# Patient Record
Sex: FEMALE | Race: WHITE | NOT HISPANIC OR LATINO | Employment: UNEMPLOYED | URBAN - METROPOLITAN AREA
[De-identification: names, ages, dates, MRNs, and addresses within clinical notes are randomized per-mention and may not be internally consistent; named-entity substitution may affect disease eponyms.]

---

## 2018-04-10 LAB
ABSOL LYMPHOCYTES (HISTORICAL): 2.1 K/UL (ref 0.5–4)
ALBUMIN SERPL BCP-MCNC: 4.5 G/DL (ref 3–5.2)
ALP SERPL-CCNC: 74 U/L (ref 43–122)
ALT SERPL W P-5'-P-CCNC: 60 U/L (ref 9–52)
ANION GAP SERPL CALCULATED.3IONS-SCNC: 12 MMOL/L (ref 5–14)
AST SERPL W P-5'-P-CCNC: 40 U/L (ref 14–36)
BASOPHILS # BLD AUTO: 0 % (ref 0–1)
BASOPHILS # BLD AUTO: 0 K/UL (ref 0–0.1)
BILIRUB SERPL-MCNC: 0.7 MG/DL
BILIRUB UR QL STRIP: NEGATIVE MG/DL
BUN SERPL-MCNC: 12 MG/DL (ref 5–25)
CALCIUM SERPL-MCNC: 9.3 MG/DL (ref 8.4–10.2)
CHLORIDE SERPL-SCNC: 102 MEQ/L (ref 97–108)
CLARITY UR: CLEAR
CO2 SERPL-SCNC: 25 MMOL/L (ref 22–30)
COLOR UR: YELLOW
COMMENT (HISTORICAL): ABNORMAL
CREATINE, SERUM (HISTORICAL): 0.6 MG/DL (ref 0.6–1.2)
DEPRECATED RDW RBC AUTO: 14.6 %
EGFR (HISTORICAL): >60 ML/MIN/1.73 M2
EOSINOPHIL # BLD AUTO: 0.1 K/UL (ref 0–0.4)
EOSINOPHIL NFR BLD AUTO: 1 % (ref 0–6)
GLUCOSE SERPL-MCNC: 102 MG/DL (ref 70–99)
GLUCOSE SERPL-MCNC: 105 MG/DL (ref 70–99)
GLUCOSE UR STRIP-MCNC: NEGATIVE MG/DL
HCT VFR BLD AUTO: 41 % (ref 36–46)
HGB BLD-MCNC: 14.1 G/DL (ref 12–16)
HGB UR QL STRIP.AUTO: 50
KETONES UR STRIP-MCNC: NEGATIVE MG/DL
LEUKOCYTE ESTERASE UR QL STRIP: 500
LYMPHOCYTES NFR BLD AUTO: 26 % (ref 25–45)
MCH RBC QN AUTO: 29.7 PG (ref 26–34)
MCHC RBC AUTO-ENTMCNC: 34.3 % (ref 31–36)
MCV RBC AUTO: 87 FL (ref 80–100)
MONOCYTES # BLD AUTO: 0.5 K/UL (ref 0.2–0.9)
MONOCYTES NFR BLD AUTO: 7 % (ref 1–10)
NEUTROPHILS ABS COUNT (HISTORICAL): 5.4 K/UL (ref 1.8–7.8)
NEUTS SEG NFR BLD AUTO: 66 % (ref 45–65)
NITRITE UR QL STRIP: NEGATIVE
PH UR STRIP.AUTO: 5 [PH] (ref 4.5–8)
PLATELET # BLD AUTO: 157 K/MCL (ref 150–450)
POTASSIUM SERPL-SCNC: 4.2 MEQ/L (ref 3.6–5)
PROT UR STRIP-MCNC: 15 MG/DL
RBC # BLD AUTO: 4.74 M/MCL (ref 4–5.2)
SODIUM SERPL-SCNC: 139 MEQ/L (ref 137–147)
SP GR UR STRIP.AUTO: 1.02 (ref 1–1.04)
TOTAL PROTEIN (HISTORICAL): 7.6 G/DL (ref 5.9–8.4)
UROBILINOGEN UR QL STRIP.AUTO: NEGATIVE MG/DL (ref 0–1)
WBC # BLD AUTO: 8.1 K/MCL (ref 4.5–11)

## 2018-05-11 ENCOUNTER — HOSPITAL ENCOUNTER (EMERGENCY)
Facility: HOSPITAL | Age: 23
Discharge: HOME | End: 2018-05-11
Attending: EMERGENCY MEDICINE | Admitting: EMERGENCY MEDICINE

## 2018-05-11 VITALS
SYSTOLIC BLOOD PRESSURE: 119 MMHG | BODY MASS INDEX: 37.5 KG/M2 | WEIGHT: 225.09 LBS | HEART RATE: 104 BPM | TEMPERATURE: 97.8 F | OXYGEN SATURATION: 98 % | DIASTOLIC BLOOD PRESSURE: 63 MMHG | HEIGHT: 65 IN | RESPIRATION RATE: 18 BRPM

## 2018-05-11 DIAGNOSIS — R55 SYNCOPE: ICD-10-CM

## 2018-05-11 DIAGNOSIS — G43.909 MIGRAINE HEADACHE: Primary | ICD-10-CM

## 2018-05-11 DIAGNOSIS — N39.0 UTI (URINARY TRACT INFECTION): ICD-10-CM

## 2018-05-11 LAB
ANION GAP SERPL CALCULATED.3IONS-SCNC: 9 MMOL/L (ref 4–13)
ATRIAL RATE: 92 BPM
BACTERIA UR QL AUTO: ABNORMAL /HPF
BASOPHILS # BLD AUTO: 0.02 THOUSANDS/ΜL (ref 0–0.1)
BASOPHILS NFR BLD AUTO: 0 % (ref 0–1)
BILIRUB UR QL STRIP: NEGATIVE
BUN SERPL-MCNC: 11 MG/DL (ref 5–25)
CALCIUM SERPL-MCNC: 9.1 MG/DL (ref 8.3–10.1)
CHLORIDE SERPL-SCNC: 101 MMOL/L (ref 100–108)
CLARITY UR: CLEAR
CO2 SERPL-SCNC: 30 MMOL/L (ref 21–32)
COLOR UR: YELLOW
COLOR, POC: YELLOW
CREAT SERPL-MCNC: 0.83 MG/DL (ref 0.6–1.3)
EOSINOPHIL # BLD AUTO: 0.05 THOUSAND/ΜL (ref 0–0.61)
EOSINOPHIL NFR BLD AUTO: 1 % (ref 0–6)
ERYTHROCYTE [DISTWIDTH] IN BLOOD BY AUTOMATED COUNT: 14.1 % (ref 11.6–15.1)
GFR SERPL CREATININE-BSD FRML MDRD: 99 ML/MIN/1.73SQ M
GLUCOSE SERPL-MCNC: 96 MG/DL (ref 65–140)
GLUCOSE UR STRIP-MCNC: NEGATIVE MG/DL
HCT VFR BLD AUTO: 38.3 % (ref 34.8–46.1)
HGB BLD-MCNC: 13.3 G/DL (ref 11.5–15.4)
HGB UR QL STRIP.AUTO: ABNORMAL
KETONES UR STRIP-MCNC: NEGATIVE MG/DL
LEUKOCYTE ESTERASE UR QL STRIP: ABNORMAL
LYMPHOCYTES # BLD AUTO: 2.91 THOUSANDS/ΜL (ref 0.6–4.47)
LYMPHOCYTES NFR BLD AUTO: 32 % (ref 14–44)
MCH RBC QN AUTO: 30.8 PG (ref 26.8–34.3)
MCHC RBC AUTO-ENTMCNC: 34.7 G/DL (ref 31.4–37.4)
MCV RBC AUTO: 89 FL (ref 82–98)
MONOCYTES # BLD AUTO: 0.71 THOUSAND/ΜL (ref 0.17–1.22)
MONOCYTES NFR BLD AUTO: 8 % (ref 4–12)
NEUTROPHILS # BLD AUTO: 5.46 THOUSANDS/ΜL (ref 1.85–7.62)
NEUTS SEG NFR BLD AUTO: 59 % (ref 43–75)
NITRITE UR QL STRIP: NEGATIVE
NON-SQ EPI CELLS URNS QL MICRO: ABNORMAL /HPF
NRBC BLD AUTO-RTO: 0 /100 WBCS
P AXIS: 63 DEGREES
PH UR STRIP.AUTO: 5 [PH] (ref 4.5–8)
PLATELET # BLD AUTO: 181 THOUSANDS/UL (ref 149–390)
PMV BLD AUTO: 11.6 FL (ref 8.9–12.7)
POTASSIUM SERPL-SCNC: 3.8 MMOL/L (ref 3.5–5.3)
PR INTERVAL: 120 MS
PROT UR STRIP-MCNC: NEGATIVE MG/DL
QRS AXIS: 46 DEGREES
QRSD INTERVAL: 82 MS
QT INTERVAL: 336 MS
QTC INTERVAL: 415 MS
RBC # BLD AUTO: 4.32 MILLION/UL (ref 3.81–5.12)
RBC #/AREA URNS AUTO: ABNORMAL /HPF
SODIUM SERPL-SCNC: 140 MMOL/L (ref 136–145)
SP GR UR STRIP.AUTO: 1.02 (ref 1–1.03)
T WAVE AXIS: 46 DEGREES
UROBILINOGEN UR QL STRIP.AUTO: 1 E.U./DL
VENTRICULAR RATE: 92 BPM
WBC # BLD AUTO: 9.15 THOUSAND/UL (ref 4.31–10.16)
WBC #/AREA URNS AUTO: ABNORMAL /HPF

## 2018-05-11 PROCEDURE — 99284 EMERGENCY DEPT VISIT MOD MDM: CPT

## 2018-05-11 PROCEDURE — 87086 URINE CULTURE/COLONY COUNT: CPT

## 2018-05-11 PROCEDURE — 96361 HYDRATE IV INFUSION ADD-ON: CPT

## 2018-05-11 PROCEDURE — 93005 ELECTROCARDIOGRAM TRACING: CPT

## 2018-05-11 PROCEDURE — 81002 URINALYSIS NONAUTO W/O SCOPE: CPT | Performed by: EMERGENCY MEDICINE

## 2018-05-11 PROCEDURE — 96375 TX/PRO/DX INJ NEW DRUG ADDON: CPT

## 2018-05-11 PROCEDURE — 85025 COMPLETE CBC W/AUTO DIFF WBC: CPT | Performed by: EMERGENCY MEDICINE

## 2018-05-11 PROCEDURE — 96374 THER/PROPH/DIAG INJ IV PUSH: CPT

## 2018-05-11 PROCEDURE — 93010 ELECTROCARDIOGRAM REPORT: CPT | Performed by: INTERNAL MEDICINE

## 2018-05-11 PROCEDURE — 80048 BASIC METABOLIC PNL TOTAL CA: CPT | Performed by: EMERGENCY MEDICINE

## 2018-05-11 PROCEDURE — 81001 URINALYSIS AUTO W/SCOPE: CPT

## 2018-05-11 PROCEDURE — 36415 COLL VENOUS BLD VENIPUNCTURE: CPT | Performed by: EMERGENCY MEDICINE

## 2018-05-11 RX ORDER — BUTALBITAL, ACETAMINOPHEN AND CAFFEINE 50; 325; 40 MG/1; MG/1; MG/1
1 TABLET ORAL EVERY 4 HOURS PRN
Qty: 15 TABLET | Refills: 0 | Status: SHIPPED | OUTPATIENT
Start: 2018-05-11 | End: 2018-11-06 | Stop reason: ALTCHOICE

## 2018-05-11 RX ORDER — METOCLOPRAMIDE HYDROCHLORIDE 5 MG/ML
10 INJECTION INTRAMUSCULAR; INTRAVENOUS ONCE
Status: COMPLETED | OUTPATIENT
Start: 2018-05-11 | End: 2018-05-11

## 2018-05-11 RX ORDER — DIPHENHYDRAMINE HYDROCHLORIDE 50 MG/ML
12.5 INJECTION INTRAMUSCULAR; INTRAVENOUS ONCE
Status: COMPLETED | OUTPATIENT
Start: 2018-05-11 | End: 2018-05-11

## 2018-05-11 RX ORDER — CEPHALEXIN 250 MG/1
500 CAPSULE ORAL EVERY 12 HOURS SCHEDULED
Qty: 28 CAPSULE | Refills: 0 | Status: SHIPPED | OUTPATIENT
Start: 2018-05-11 | End: 2018-05-18

## 2018-05-11 RX ORDER — KETOROLAC TROMETHAMINE 30 MG/ML
30 INJECTION, SOLUTION INTRAMUSCULAR; INTRAVENOUS ONCE
Status: COMPLETED | OUTPATIENT
Start: 2018-05-11 | End: 2018-05-11

## 2018-05-11 RX ADMIN — SODIUM CHLORIDE 1000 ML: 0.9 INJECTION, SOLUTION INTRAVENOUS at 19:21

## 2018-05-11 RX ADMIN — KETOROLAC TROMETHAMINE 30 MG: 30 INJECTION, SOLUTION INTRAMUSCULAR at 19:15

## 2018-05-11 RX ADMIN — DIPHENHYDRAMINE HYDROCHLORIDE 12.5 MG: 50 INJECTION, SOLUTION INTRAMUSCULAR; INTRAVENOUS at 19:18

## 2018-05-11 RX ADMIN — METOCLOPRAMIDE 10 MG: 5 INJECTION, SOLUTION INTRAMUSCULAR; INTRAVENOUS at 19:16

## 2018-05-11 NOTE — ED PROVIDER NOTES
History  Chief Complaint   Patient presents with    Headache - Recurrent or Known Dx Migraines     pt reports "major migraines" for the past 3 days, reports syncope x2 as well since Saturday  25-year-old female with a history of migraine headaches, bipolar depression, Asperger's disorder and arthritis presents to the emergency department with migraine headache for the last 3 days and also episodes of syncope  Patient states that this migraine is located frontal and by temporal which is her usual location of the migraine  She states it has been constant for the last 3 days and she has been taking Excedrin for it without relief  She has had no fevers or chills  No nausea or vomiting  No chest pain or shortness of breath  She states that she was at [email or web address] a good time she was on a ride with her mother when she passed out for a few seconds  She states that this never happened to her before  Yesterday she states she was at work and was found by another worker on the floor twice  She does not remember feeling like she was going to pass out and has no idea how long she was unconscious  She states she could leave work and so decided to come today to be evaluated          History provided by:  Patient   used: No    Headache - Recurrent or Known Dx Migraines   Pain location:  Frontal, L parietal and R parietal  Quality:  Unable to specify  Radiates to:  Does not radiate  Severity currently:  7/10 (Patient states she normally lives with a 7/10 headache)  Severity at highest:  10/10  Onset quality:  Gradual  Duration:  3 days  Timing:  Constant  Progression:  Unchanged  Chronicity:  Recurrent  Similar to prior headaches: yes    Context: not activity, not exposure to bright light, not stress and not straining    Relieved by:  Nothing  Worsened by:  Nothing  Ineffective treatments:  Acetaminophen  Associated symptoms: syncope    Associated symptoms: no abdominal pain, no back pain, no blurred vision, no congestion, no cough, no diarrhea, no dizziness, no drainage, no ear pain, no eye pain, no facial pain, no fatigue, no fever, no focal weakness, no hearing loss, no loss of balance, no myalgias, no nausea, no near-syncope, no neck pain, no neck stiffness, no numbness, no paresthesias, no photophobia, no seizures, no sinus pressure, no sore throat, no swollen glands, no tingling, no URI, no visual change, no vomiting and no weakness    Syncope:     LOC duration: Unknown  Witnessed: yes      Suspicion of head trauma:  No      None       Past Medical History:   Diagnosis Date    ADHD (attention deficit hyperactivity disorder)     Arthritis     Asperger's disorder     Astigmatism     Bipolar 1 disorder (HCC)     Migraine        History reviewed  No pertinent surgical history  History reviewed  No pertinent family history  I have reviewed and agree with the history as documented  Social History   Substance Use Topics    Smoking status: Never Smoker    Smokeless tobacco: Never Used    Alcohol use No        Review of Systems   Constitutional: Negative  Negative for chills, diaphoresis, fatigue and fever  HENT: Negative  Negative for congestion, ear pain, hearing loss, postnasal drip, rhinorrhea, sinus pressure and sore throat  Eyes: Negative  Negative for blurred vision, photophobia, pain, discharge, redness and itching  Respiratory: Negative  Negative for apnea, cough, chest tightness, shortness of breath and wheezing  Cardiovascular: Positive for syncope  Negative for chest pain, palpitations, leg swelling and near-syncope  Gastrointestinal: Negative  Negative for abdominal pain, diarrhea, nausea and vomiting  Endocrine: Negative  Genitourinary: Negative  Negative for flank pain, frequency and urgency  Musculoskeletal: Negative  Negative for back pain, myalgias, neck pain and neck stiffness  Skin: Negative  Allergic/Immunologic: Negative  Neurological: Positive for syncope and headaches  Negative for dizziness, tremors, focal weakness, seizures, facial asymmetry, speech difficulty, weakness, light-headedness, numbness, paresthesias and loss of balance  Hematological: Negative  Psychiatric/Behavioral: Negative  All other systems reviewed and are negative  Physical Exam  ED Triage Vitals [05/11/18 1832]   Temperature Pulse Respirations Blood Pressure SpO2   97 8 °F (36 6 °C) 98 18 165/88 98 %      Temp Source Heart Rate Source Patient Position - Orthostatic VS BP Location FiO2 (%)   Temporal Monitor Sitting Right arm --      Pain Score       6           Orthostatic Vital Signs  Vitals:    05/11/18 1832 05/11/18 1930 05/11/18 2014   BP: 165/88  119/63   Pulse: 98 84 104   Patient Position - Orthostatic VS: Sitting         Physical Exam   Constitutional: She is oriented to person, place, and time  She appears well-developed and well-nourished  Non-toxic appearance  She does not have a sickly appearance  She does not appear ill  No distress  Patient sitting up in bed legs crossed on cell phone in no distress   HENT:   Head: Normocephalic and atraumatic  Right Ear: Tympanic membrane and external ear normal    Left Ear: Tympanic membrane and external ear normal    Nose: Nose normal    Mouth/Throat: Oropharynx is clear and moist  No oropharyngeal exudate  Eyes: Conjunctivae and EOM are normal  Pupils are equal, round, and reactive to light  Right eye exhibits no discharge  Left eye exhibits no discharge  No scleral icterus  Neck: Normal range of motion  Neck supple  No spinous process tenderness and no muscular tenderness present  No neck rigidity  No edema, no erythema and normal range of motion present  Cardiovascular: Normal rate, regular rhythm and normal heart sounds  Exam reveals no gallop and no friction rub  No murmur heard  Pulmonary/Chest: Effort normal and breath sounds normal  No respiratory distress   She has no wheezes  She has no rales  She exhibits no tenderness  Abdominal: Soft  Bowel sounds are normal  She exhibits no distension and no mass  There is no tenderness  No hernia  Musculoskeletal: Normal range of motion  She exhibits no edema, tenderness or deformity  Lymphadenopathy:     She has no cervical adenopathy  Neurological: She is alert and oriented to person, place, and time  She has normal reflexes  She displays normal reflexes  No cranial nerve deficit  She exhibits normal muscle tone  Coordination normal    Skin: Skin is warm and dry  No rash noted  She is not diaphoretic  No erythema  No pallor  Psychiatric: She has a normal mood and affect  Nursing note and vitals reviewed  ED Medications  Medications   sodium chloride 0 9 % bolus 1,000 mL (0 mL Intravenous Stopped 5/11/18 2018)   ketorolac (TORADOL) injection 30 mg (30 mg Intravenous Given 5/11/18 1915)   metoclopramide (REGLAN) injection 10 mg (10 mg Intravenous Given 5/11/18 1916)   diphenhydrAMINE (BENADRYL) injection 12 5 mg (12 5 mg Intravenous Given 5/11/18 1918)       Diagnostic Studies  Results Reviewed     Procedure Component Value Units Date/Time    Basic metabolic panel [[id number]] Collected:  05/11/18 1914    Lab Status:  Final result Specimen:  Blood from Arm, Right Updated:  05/11/18 1948     Sodium 140 mmol/L      Potassium 3 8 mmol/L      Chloride 101 mmol/L      CO2 30 mmol/L      Anion Gap 9 mmol/L      BUN 11 mg/dL      Creatinine 0 83 mg/dL      Glucose 96 mg/dL      Calcium 9 1 mg/dL      eGFR 99 ml/min/1 73sq m     Narrative:         National Kidney Disease Education Program recommendations are as follows:  GFR calculation is accurate only with a steady state creatinine  Chronic Kidney disease less than 60 ml/min/1 73 sq  meters  Kidney failure less than 15 ml/min/1 73 sq  meters      Urine Microscopic [[id number]]  (Abnormal) Collected:  05/11/18 1856    Lab Status:  Final result Specimen:  Urine from Urine, Clean Catch Updated:  05/11/18 1940     RBC, UA 0-1 (A) /hpf      WBC, UA 10-20 (A) /hpf      Epithelial Cells Occasional /hpf      Bacteria, UA Occasional /hpf     Urine culture [[id number]] Collected:  05/11/18 1856    Lab Status: In process Specimen:  Urine from Urine, Clean Catch Updated:  05/11/18 1939    CBC and differential [[id number]] Collected:  05/11/18 1914    Lab Status:  Final result Specimen:  Blood from Arm, Right Updated:  05/11/18 1926     WBC 9 15 Thousand/uL      RBC 4 32 Million/uL      Hemoglobin 13 3 g/dL      Hematocrit 38 3 %      MCV 89 fL      MCH 30 8 pg      MCHC 34 7 g/dL      RDW 14 1 %      MPV 11 6 fL      Platelets 193 Thousands/uL      nRBC 0 /100 WBCs      Neutrophils Relative 59 %      Lymphocytes Relative 32 %      Monocytes Relative 8 %      Eosinophils Relative 1 %      Basophils Relative 0 %      Neutrophils Absolute 5 46 Thousands/µL      Lymphocytes Absolute 2 91 Thousands/µL      Monocytes Absolute 0 71 Thousand/µL      Eosinophils Absolute 0 05 Thousand/µL      Basophils Absolute 0 02 Thousands/µL     POCT urinalysis dipstick [[id number]]  (Normal) Resulted:  05/11/18 1908    Lab Status:  Final result Specimen:  Urine Updated:  05/11/18 1908     Color, UA yellow    ED Urine Macroscopic [[id number]]  (Abnormal) Collected:  05/11/18 1856    Lab Status:  Final result Specimen:  Urine Updated:  05/11/18 1855     Color, UA Yellow     Clarity, UA Clear     pH, UA 5 0     Leukocytes, UA Large (A)     Nitrite, UA Negative     Protein, UA Negative mg/dl      Glucose, UA Negative mg/dl      Ketones, UA Negative mg/dl      Urobilinogen, UA 1 0 E U /dl      Bilirubin, UA Negative     Blood, UA Small (A)     Specific [place], UA 1 020    Narrative:       CLINITEK RESULT                 No orders to display              Procedures  Procedures       Phone Contacts  ED Phone Contact    ED Course  ED Course as of May 12 0133   Fri May 11, 2018   2003 Patient feeling better  Updated about results    Stable for discharge                                MDM  Number of Diagnoses or Management Options  Diagnosis management comments: 77-year-old female presents with migraine headache for 3 days and also multiple syncopal events  She states the migraine is typical of her usual and she has had migraines since childhood  She has never seen a neurologist   What concerned her is that she has had a couple of episodes of passing out over the last couple of days  This is new for her  First 1 [place]  She was on a ride when this happened  She states she remembers her mom shaking her to wake her up  She went on with her day  Yesterday she was at work and was found by a co-worker on the floor twice  She does not remember feeling like she was going to pass out has had no symptoms  She continued her work day but decided to come here today to be evaluated  On exam she appears very well and is sitting up in bed on her phone in no distress  Her neuro exam here is completely normal  I am uncertain that she actually had any syncopal events  She does admit to being tired and may have just simply fallen asleep  The syncopal event on the ride may have been secondary to being on a ride with constant spinning motion  I do not believe in any way that this syncopal event was secondary to her headache  The headache had a gradual onset and has been constant  She seems to be functioning normally despite the headache and the syncopal events  She seems to be in no distress about it  Will check EKG to assess intervals  Will check basic labs to rule out anemia/electrolyte abnormality    Will give migraine cocktail and reassess       Amount and/or Complexity of Data Reviewed  Clinical lab tests: ordered and reviewed  Independent visualization of images, tracings, or specimens: yes      CritCare Time    Disposition  Final diagnoses:   Migraine headache   Syncope   UTI (urinary tract infection)     Time reflects when diagnosis was documented in both MDM as applicable and the Disposition within this note     Time User Action Codes Description Comment    5/11/2018  8:05 PM Farrah Arango Add [G43 909] Migraine headache     5/11/2018  8:06 PM Magali PECK Add [R55] Syncope     5/11/2018  8:06 PM Magali PECK Add [N39 0] UTI (urinary tract infection)       ED Disposition     ED Disposition Condition Comment    Discharge  [facility] discharge to home/self care  Condition at discharge: Good        Follow-up Information     Follow up With Specialties Details Why [address] [facility] Schedule an appointment as soon as possible for a visit in 1 week  Ramin Ren [address]  [phone]        Discharge Medication List as of 5/11/2018  8:10 PM      START taking these medications    Details   butalbital-acetaminophen-caffeine (FIORICET,ESGIC) -40 mg per tablet Take 1 tablet by mouth every 4 (four) hours as needed for headaches, Starting Fri 5/11/2018, Print      cephalexin (KEFLEX) 250 mg capsule Take 2 capsules (500 mg total) by mouth every 12 (twelve) hours for 7 days, Starting Fri 5/11/2018, Until Fri 5/18/2018, Print           No discharge procedures on file      ED Provider  Electronically Signed by           Jerrod Powers DO  05/11/18 2009       Jerrod Powers DO  05/12/18 9354

## 2018-05-11 NOTE — ED PROCEDURE NOTE
PROCEDURE  ECG 12 Lead Documentation  Date/Time: 5/11/2018 7:11 PM  Performed by: Will Reason  Authorized by: Will Ricci     ECG reviewed by me, the ED Provider: yes    Patient location:  ED  Interpretation:     Interpretation: normal    Rate:     ECG rate assessment: normal    Rhythm:     Rhythm: sinus rhythm    Ectopy:     Ectopy: none    QRS:     QRS axis:  Normal  Conduction:     Conduction: normal    ST segments:     ST segments:  Normal  T waves:     T waves: normal           Carol Ann Stanley DO  05/11/18 1912

## 2018-05-11 NOTE — ED NOTES
Patient is resting comfortably with visitor at bedside watching TV       Ana Carrington RN  05/11/18 6113

## 2018-05-12 NOTE — DISCHARGE INSTRUCTIONS
Migraine Headache   WHAT YOU NEED TO KNOW:   A migraine is a severe headache  The pain can be so severe that it interferes with your daily activities  A migraine can last a few hours up to several days  The exact cause of migraines is not known  DISCHARGE INSTRUCTIONS:   Return to the emergency department if:   · You have a headache that seems different or much worse than your usual migraine headache  · You have a severe headache with a fever or a stiff neck  · You have new problems with speech, vision, balance, or movement  · You feel like you are going to faint, you become confused, or you have a seizure  Contact your healthcare provider or neurologist if:   · Your migraines interfere with your daily activities  · Your medicines or treatments stop working  · You have questions or concerns about your condition or care  Medicines: You may need any of the following  Take medicine as soon as you feel a migraine begin  · Prescription pain medicine  may be given  Do not wait until the pain is severe before you take your medicine  · Migraine medicines  are used to help prevent a migraine or stop it once it starts  · Antinausea medicine  may be given to calm your stomach and to help prevent vomiting  This medicine can also help relieve pain  · Take your medicine as directed  Contact your healthcare provider if you think your medicine is not helping or if you have side effects  Tell him or her if you are allergic to any medicine  Keep a list of the medicines, vitamins, and herbs you take  Include the amounts, and when and why you take them  Bring the list or the pill bottles to follow-up visits  Carry your medicine list with you in case of an emergency  Manage your symptoms:   · Rest in a dark, quiet room  This will help decrease your pain  Sleep may also help relieve the pain  · Apply ice to decrease pain  Use an ice pack, or put crushed ice in a plastic bag   Cover the ice pack with a towel and place it on your head  Apply ice for 15 to 20 minutes every hour  · Apply heat to decrease pain and muscle spasms  Use a small towel dampened with warm water or a heating pad, or sit in a warm bath  Apply heat on the area for 20 to 30 minutes every 2 hours  You may alternate heat and ice  · Keep a migraine record  Write down when your migraines start and stop  Include your symptoms and what you were doing when a migraine began  Record what you ate or drank for 24 hours before the migraine started  Keep track of what you did to treat your migraine and if it worked  Bring the migraine record with you to visits with your healthcare provider  Follow up with your healthcare provider or neurologist as directed:  Bring your migraine record with you  Write down your questions so you remember to ask them during your visits  Prevent another migraine:   · Do not smoke  Nicotine and other chemicals in cigarettes and cigars can trigger a migraine or make it worse  Ask your healthcare provider for information if you currently smoke and need help to quit  E-cigarettes or smokeless tobacco still contain nicotine  Talk to your healthcare provider before you use these products  · Do not drink alcohol  Alcohol can trigger a migraine  It can also keep medicines used to treat your migraines from working  · Get regular exercise  Exercise may help prevent migraines  Talk to your healthcare provider about the best exercise plan for you  Try to get at least 30 minutes of exercise on most days  · Manage stress  Stress may trigger a migraine  Learn new ways to relax, such as deep breathing  · Create a sleep schedule  Go to bed and get up at the same times each day  Do not watch television before bed  · Eat regular meals  Include healthy foods such as include fruit, vegetables, whole-grain breads, low-fat dairy products, beans, lean meat, and fish   Do not have food or drinks that trigger your migraines  © 2017 [facility] Information is for End User's use only and may not be sold, redistributed or otherwise used for commercial purposes  All illustrations and images included in CareNotes® are the copyrighted property of A D A M [employer]  or Michael Garcia  The above information is an  only  It is not intended as medical advice for individual conditions or treatments  Talk to your doctor, nurse or pharmacist before following any medical regimen to see if it is safe and effective for you  Syncope   WHAT YOU NEED TO KNOW:   Syncope is also called fainting or passing out  Syncope is a sudden, temporary loss of consciousness, followed by a fall from a standing or sitting position  Syncope ranges from not serious to a sign of a more serious condition that needs to be treated  You can control some health conditions that cause syncope  Your healthcare providers can help you create a plan to manage syncope and prevent episodes  DISCHARGE INSTRUCTIONS:   Seek care immediately if:   · You are bleeding because you hit your head when you fainted  · You suddenly have double vision, difficulty speaking, numbness, and cannot move your arms or legs  · You have chest pain and trouble breathing  · You vomit blood or material that looks like coffee grounds  · You see blood in your bowel movement  Contact your healthcare provider if:   · You have new or worsening symptoms  · You have another syncope episode  · You have a headache, fast heartbeat, or feel too dizzy to stand up  · You have questions or concerns about your condition or care  Follow up with your healthcare provider as directed:  Write down your questions so you remember to ask them during your visits  Manage syncope:   · Keep a record of your syncope episodes  Include your symptoms and your activity before and after the episode   The record can help your healthcare provider find the cause of your syncope and help you manage episodes  · Sit or lie down when needed  This includes when you feel dizzy, your throat is getting tight, and your vision changes  Raise your legs above the level of your heart  · Take slow, deep breaths if you start to breathe faster with anxiety or fear  This can help decrease dizziness and the feeling that you might faint  · Check your blood pressure often  This is important if you take medicine to lower your blood pressure  Check your blood pressure when you are lying down and when you are standing  Ask how often to check during the day  Keep a record of your blood pressure numbers  Your healthcare provider may use the record to help plan your treatment  Prevent a syncope episode:   · Move slowly and let yourself get used to one position before you move to another position  This is very important when you change from a lying or sitting position to a standing position  Take some deep breaths before you stand up from a lying position  Stand up slowly  Sudden movements may cause a fainting spell  Sit on the side of the bed or couch for a few minutes before you stand up  If you are on bedrest, try to be upright for about 2 hours each day, or as directed  Do not lock your legs if you are standing for a long period of time  Move your legs and bend your knees to keep blood flowing  · Follow your healthcare provider's recommendations  Your provider may  recommend that you drink more liquids to prevent dehydration  You may also need to have more salt to keep your blood pressure from dropping too low and causing syncope  Your provider will tell you how much liquid and sodium to have each day  · Watch for signs of low blood sugar  These include hunger, nervousness, sweating, and fast or fluttery heartbeats  Talk with your healthcare provider about ways to keep your blood sugar level steady  · Do not strain if you are constipated    You may faint if you strain to have a bowel movement  Walking is the best way to get your bowels moving  Eat foods high in fiber to make it easier to have a bowel movement  Good examples are high-fiber cereals, beans, vegetables, and whole-grain breads  Prune juice may help make bowel movements softer  · Be careful in hot weather  Heat can cause a syncope episode  Limit activity done outside on hot days  Physical activity in hot weather can lead to dehydration  This can cause an episode  © 2017 [address] [employer]  Information is for End User's use only and may not be sold, redistributed or otherwise used for commercial purposes  All illustrations and images included in CareNotes® are the copyrighted property of A [employer] M [employer]  or Michael Garcia  The above information is an  only  It is not intended as medical advice for individual conditions or treatments  Talk to your doctor, nurse or pharmacist before following any medical regimen to see if it is safe and effective for you  Urinary Tract Infection in Women   WHAT YOU NEED TO KNOW:   A urinary tract infection (UTI) is caused by bacteria that get inside your urinary tract  Most bacteria that enter your urinary tract come out when you urinate  If the bacteria stay in your urinary tract, you may get an infection  Your urinary tract includes your kidneys, ureters, bladder, and urethra  Urine is made in your kidneys, and it flows from the ureters to the bladder  Urine leaves the bladder through the urethra  A UTI is more common in your lower urinary tract, which includes your bladder and urethra  DISCHARGE INSTRUCTIONS:   Return to the emergency department if:   · You are urinating very little or not at all  · You have a high fever with shaking chills  · You have side or back pain that gets worse  Contact your healthcare provider if:   · You have a fever  · You do not feel better after 2 days of taking antibiotics  · You are vomiting  · You have questions or concerns about your condition or care  Medicines:   · Antibiotics  help fight a bacterial infection  · Medicines  may be given to decrease pain and burning when you urinate  They will also help decrease the feeling that you need to urinate often  These medicines will make your urine orange or red  · Take your medicine as directed  Contact your healthcare provider if you think your medicine is not helping or if you have side effects  Tell him or her if you are allergic to any medicine  Keep a list of the medicines, vitamins, and herbs you take  Include the amounts, and when and why you take them  Bring the list or the pill bottles to follow-up visits  Carry your medicine list with you in case of an emergency  Follow up with your healthcare provider as directed:  Write down your questions so you remember to ask them during your visits  Prevent another UTI:   · Empty your bladder often  Urinate and empty your bladder as soon as you feel the need  Do not hold your urine for long periods of time  · Wipe from front to back after you urinate or have a bowel movement  This will help prevent germs from getting into your urinary tract through your urethra  · Drink liquids as directed  Ask how much liquid to drink each day and which liquids are best for you  You may need to drink more liquids than usual to help flush out the bacteria  Do not drink alcohol, caffeine, or citrus juices  These can irritate your bladder and increase your symptoms  Your healthcare provider may recommend cranberry juice to help prevent a UTI  · Urinate after you have sex  This can help flush out bacteria passed during sex  · Do not douche or use feminine deodorants  These can change the chemical balance in your vagina  · Change sanitary pads or tampons often  This will help prevent germs from getting into your urinary tract  · Do pelvic muscle exercises often    Pelvic muscle exercises may help you start and stop urinating  Strong pelvic muscles may help you empty your bladder easier  Squeeze these muscles tightly for 5 seconds like you are trying to hold back urine  Then relax for 5 seconds  Gradually work up to squeezing for 10 seconds  Do 3 sets of 15 repetitions a day, or as directed  © 2017 [address] [employer] Information is for End User's use only and may not be sold, redistributed or otherwise used for commercial purposes  All illustrations and images included in CareNotes® are the copyrighted property of [employer]  or Michael Garcia  The above information is an  only  It is not intended as medical advice for individual conditions or treatments  Talk to your doctor, nurse or pharmacist before following any medical regimen to see if it is safe and effective for you

## 2018-05-16 LAB — BACTERIA UR CULT: NORMAL

## 2018-11-02 ENCOUNTER — TELEPHONE (OUTPATIENT)
Dept: OBGYN CLINIC | Facility: CLINIC | Age: 23
End: 2018-11-02

## 2018-11-06 ENCOUNTER — ANNUAL EXAM (OUTPATIENT)
Dept: OBGYN CLINIC | Facility: CLINIC | Age: 23
End: 2018-11-06
Payer: MEDICARE

## 2018-11-06 VITALS — BODY MASS INDEX: 42.77 KG/M2 | WEIGHT: 257 LBS

## 2018-11-06 DIAGNOSIS — Z12.39 SCREENING FOR BREAST CANCER: ICD-10-CM

## 2018-11-06 DIAGNOSIS — Z12.4 SCREENING FOR CERVICAL CANCER: ICD-10-CM

## 2018-11-06 DIAGNOSIS — Z11.3 SCREEN FOR STD (SEXUALLY TRANSMITTED DISEASE): ICD-10-CM

## 2018-11-06 DIAGNOSIS — Z01.419 ENCOUNTER FOR GYNECOLOGICAL EXAMINATION WITHOUT ABNORMAL FINDING: Primary | ICD-10-CM

## 2018-11-06 PROCEDURE — 88141 CYTOPATH C/V INTERPRET: CPT | Performed by: PATHOLOGY

## 2018-11-06 PROCEDURE — 87491 CHLMYD TRACH DNA AMP PROBE: CPT | Performed by: NURSE PRACTITIONER

## 2018-11-06 PROCEDURE — 99385 PREV VISIT NEW AGE 18-39: CPT | Performed by: NURSE PRACTITIONER

## 2018-11-06 PROCEDURE — 87591 N.GONORRHOEAE DNA AMP PROB: CPT | Performed by: NURSE PRACTITIONER

## 2018-11-06 PROCEDURE — G0145 SCR C/V CYTO,THINLAYER,RESCR: HCPCS | Performed by: PATHOLOGY

## 2018-11-06 NOTE — PROGRESS NOTES
Gio Acosta is a 20 y o  female who presents today for annual GYN exam   Her last pap smear was performed 2017 and result was HSIL  She had colposcopy performed 2017 which noted CIN1 and biopsy was benign  She was supposed to have follow up pap smear 6 months from then, but did not follow up  She reports menses as irregular secondary to PCOS  Patient's last menstrual period was 2018 (exact date)  Her contraceptive method is condoms  Her general medical history has been reviewed and she reports it as follows:    Past Medical History:   Diagnosis Date    ADHD (attention deficit hyperactivity disorder)     Bipolar 1 disorder ([facility] [address] )     PCOS (polycystic ovarian syndrome)      Past Surgical History:   Procedure Laterality Date    NO PAST SURGERIES       OB History      Para Term  AB Living    2 0 0 0 2 0    SAB TAB Ectopic Multiple Live Births    2 0 0 0 0        Social History   Substance Use Topics    Smoking status: Never Smoker    Smokeless tobacco: Never Used    Alcohol use Yes      Comment: 2-3x/month     Cancer-related family history includes Cancer in her mother (cervical)  There is no history of Breast cancer  Current medications: none    Review of Systems:  Review of Systems   Constitutional: Negative  Gastrointestinal: Negative  Genitourinary: Negative for difficulty urinating, pelvic pain and vaginal discharge  Skin: Negative  Physical Exam:  Wt 117 kg (257 lb)   LMP 2018 (Exact Date)   BMI 42 77 kg/m²   Physical Exam   Constitutional: She is oriented to person, place, and time  She appears well-developed and well-nourished  Hirsutism noted   Genitourinary: Vagina normal and uterus normal  There is no lesion on the right labia  There is no lesion on the left labia  Vagina exhibits no lesion and no rugosity  No vaginal discharge found   Right adnexum does not display mass and does not display tenderness  Left adnexum does not display mass and does not display tenderness  Cervix does not exhibit motion tenderness, lesion or pinkness  Uterus is not tender  Neck: Neck supple  No thyromegaly present  Cardiovascular: Normal rate and regular rhythm  Pulmonary/Chest: Effort normal and breath sounds normal  Right breast exhibits no mass, no nipple discharge, no skin change and no tenderness  Left breast exhibits no mass, no nipple discharge, no skin change and no tenderness  Abdominal: Soft  Bowel sounds are normal    Neurological: She is alert and oriented to person, place, and time  Skin: Skin is warm and dry  Assessment:   1  Normal well-woman GYN exam     Plan:   1  Pap smear done with HPV reflex  2  Cultures ordered: GC/CT  3  Return to office 1 year

## 2018-11-06 NOTE — LETTER
2018    To Deep Moody  : 1995      This letter is to advise you that your recent PAP SMEAR results were reviewed by me and are NORMAL    We will see you in 1 year for your annual exam     COY Swanson

## 2018-11-08 ENCOUNTER — OFFICE VISIT (OUTPATIENT)
Dept: FAMILY MEDICINE CLINIC | Facility: CLINIC | Age: 23
End: 2018-11-08
Payer: MEDICARE

## 2018-11-08 VITALS
TEMPERATURE: 96.1 F | WEIGHT: 251 LBS | SYSTOLIC BLOOD PRESSURE: 110 MMHG | HEIGHT: 65 IN | BODY MASS INDEX: 41.82 KG/M2 | DIASTOLIC BLOOD PRESSURE: 80 MMHG | RESPIRATION RATE: 15 BRPM | HEART RATE: 86 BPM | OXYGEN SATURATION: 98 %

## 2018-11-08 DIAGNOSIS — G89.29 CHRONIC PAIN OF BOTH KNEES: ICD-10-CM

## 2018-11-08 DIAGNOSIS — M25.561 CHRONIC PAIN OF BOTH KNEES: ICD-10-CM

## 2018-11-08 DIAGNOSIS — J45.20 MILD INTERMITTENT ASTHMA WITHOUT COMPLICATION: ICD-10-CM

## 2018-11-08 DIAGNOSIS — Z00.00 ENCOUNTER FOR ANNUAL PHYSICAL EXAM: Primary | ICD-10-CM

## 2018-11-08 DIAGNOSIS — M54.50 CHRONIC MIDLINE LOW BACK PAIN WITHOUT SCIATICA: ICD-10-CM

## 2018-11-08 DIAGNOSIS — M25.562 CHRONIC PAIN OF BOTH KNEES: ICD-10-CM

## 2018-11-08 DIAGNOSIS — E28.2 PCOS (POLYCYSTIC OVARIAN SYNDROME): ICD-10-CM

## 2018-11-08 DIAGNOSIS — G89.29 CHRONIC MIDLINE LOW BACK PAIN WITHOUT SCIATICA: ICD-10-CM

## 2018-11-08 PROCEDURE — 99395 PREV VISIT EST AGE 18-39: CPT | Performed by: PHYSICIAN ASSISTANT

## 2018-11-08 PROCEDURE — 3725F SCREEN DEPRESSION PERFORMED: CPT | Performed by: PHYSICIAN ASSISTANT

## 2018-11-08 RX ORDER — ALBUTEROL SULFATE 90 UG/1
2 AEROSOL, METERED RESPIRATORY (INHALATION) EVERY 6 HOURS PRN
Qty: 18 G | Refills: 0 | Status: SHIPPED | OUTPATIENT
Start: 2018-11-08 | End: 2019-01-23

## 2018-11-08 RX ORDER — IBUPROFEN 800 MG/1
800 TABLET ORAL EVERY 8 HOURS PRN
Qty: 60 TABLET | Refills: 0 | Status: SHIPPED | OUTPATIENT
Start: 2018-11-08 | End: 2019-01-23
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
## 2018-11-08 NOTE — ASSESSMENT & PLAN NOTE
- will prescribe inhaler for patient to use when needed for both seasonal asthma/allergies and exercise induced asthma

## 2018-11-08 NOTE — ASSESSMENT & PLAN NOTE
- will prescribe ibuprofen 800 mg TID PRN for pain; advised patient not to take more than 3 times per day due to risk of kidney toxicity   - will refer to physical therapy

## 2018-11-08 NOTE — ASSESSMENT & PLAN NOTE
- patient has strong family history of diabetes, so will order A1c and CMP to check sugars and kidney function   - patient is working on weight loss; recently started medications from Dr Jonas Enciso, and has been working on being more physically active

## 2018-11-09 ENCOUNTER — TELEPHONE (OUTPATIENT)
Dept: OBGYN CLINIC | Facility: CLINIC | Age: 23
End: 2018-11-09

## 2018-11-09 DIAGNOSIS — A64 STD (FEMALE): Primary | ICD-10-CM

## 2018-11-09 LAB
CHLAMYDIA DNA CVX QL NAA+PROBE: NORMAL
N GONORRHOEA DNA GENITAL QL NAA+PROBE: NORMAL

## 2018-11-09 NOTE — TELEPHONE ENCOUNTER
Patient is at the pharmacy but, the medication is not there  I attempted to reach the back line but, there was no answer

## 2018-11-09 NOTE — TELEPHONE ENCOUNTER
I called patient to review + testing for gonorrhea and chlamydia  I advised her that I want a urine specimen for confirmation of these test results  And that she needs treatment with Ceftriaxone and Azithromycin  I called in Rx to [facility] pharmacy now  She will come and get medication from pharmacy first and then come here to for administration of IM Ceftriaxone  She will give us urine specimen then while she is here  She agrees with plan

## 2018-11-12 DIAGNOSIS — A54.9 GONORRHEA: Primary | ICD-10-CM

## 2018-11-13 ENCOUNTER — TELEPHONE (OUTPATIENT)
Dept: OBGYN CLINIC | Facility: CLINIC | Age: 23
End: 2018-11-13

## 2018-11-13 LAB
LAB AP GYN PRIMARY INTERPRETATION: NORMAL
LAB AP LMP: NORMAL
Lab: NORMAL
PATH INTERP SPEC-IMP: NORMAL

## 2018-11-13 NOTE — TELEPHONE ENCOUNTER
I called patient and discussed with her that we are having difficulty obtaining insurance approval for her Ceftriaxone therapy  Advised her to go to [facility] this afternoon and can receive free treatment with Ceftriaxone and Azithromycin  Patient agrees

## 2018-11-14 ENCOUNTER — TELEPHONE (OUTPATIENT)
Dept: OBGYN CLINIC | Facility: CLINIC | Age: 23
End: 2018-11-14

## 2018-11-14 DIAGNOSIS — A59.9 TRICHOMONIASIS: Primary | ICD-10-CM

## 2018-11-14 RX ORDER — METRONIDAZOLE 500 MG/1
500 TABLET ORAL EVERY 12 HOURS SCHEDULED
Qty: 14 TABLET | Refills: 0 | Status: SHIPPED | OUTPATIENT
Start: 2018-11-14 | End: 2018-11-21

## 2018-11-14 NOTE — TELEPHONE ENCOUNTER
Patient called back  I reviewed + trichomonas on her pap  Sent Rx Flagyl to her pharmacy  She agrees

## 2019-01-23 ENCOUNTER — APPOINTMENT (EMERGENCY)
Dept: ULTRASOUND IMAGING | Facility: HOSPITAL | Age: 24
End: 2019-01-23
Payer: COMMERCIAL

## 2019-01-23 ENCOUNTER — HOSPITAL ENCOUNTER (EMERGENCY)
Facility: HOSPITAL | Age: 24
Discharge: HOME | End: 2019-01-23
Attending: EMERGENCY MEDICINE | Admitting: EMERGENCY MEDICINE
Payer: COMMERCIAL

## 2019-01-23 VITALS
DIASTOLIC BLOOD PRESSURE: 76 MMHG | HEART RATE: 83 BPM | TEMPERATURE: 98.9 F | OXYGEN SATURATION: 98 % | BODY MASS INDEX: 42.47 KG/M2 | WEIGHT: 251.32 LBS | SYSTOLIC BLOOD PRESSURE: 141 MMHG | RESPIRATION RATE: 16 BRPM

## 2019-01-23 DIAGNOSIS — R55 VASOVAGAL NEAR SYNCOPE: ICD-10-CM

## 2019-01-23 DIAGNOSIS — N93.9 VAGINAL BLEEDING: Primary | ICD-10-CM

## 2019-01-23 LAB
ALBUMIN SERPL BCP-MCNC: 5 G/DL (ref 3–5.2)
ALP SERPL-CCNC: 81 U/L (ref 43–122)
ALT SERPL W P-5'-P-CCNC: 42 U/L (ref 9–52)
ANION GAP SERPL CALCULATED.3IONS-SCNC: 11 MMOL/L (ref 5–14)
AST SERPL W P-5'-P-CCNC: 44 U/L (ref 14–36)
ATRIAL RATE: 97 BPM
B-HCG SERPL-ACNC: <3 MIU/ML
BACTERIA UR QL AUTO: ABNORMAL /HPF
BASOPHILS # BLD AUTO: 0 THOUSANDS/ΜL (ref 0–0.1)
BASOPHILS NFR BLD AUTO: 1 % (ref 0–1)
BILIRUB SERPL-MCNC: 0.8 MG/DL
BILIRUB UR QL STRIP: NEGATIVE
BUN SERPL-MCNC: 13 MG/DL (ref 5–25)
CALCIUM SERPL-MCNC: 9.8 MG/DL (ref 8.4–10.2)
CHLORIDE SERPL-SCNC: 101 MMOL/L (ref 97–108)
CLARITY UR: ABNORMAL
CO2 SERPL-SCNC: 26 MMOL/L (ref 22–30)
COLOR UR: ABNORMAL
CREAT SERPL-MCNC: 0.69 MG/DL (ref 0.6–1.2)
D-DIMER: 0.19 MG/L
EOSINOPHIL # BLD AUTO: 0 THOUSAND/ΜL (ref 0–0.4)
EOSINOPHIL NFR BLD AUTO: 0 % (ref 0–6)
ERYTHROCYTE [DISTWIDTH] IN BLOOD BY AUTOMATED COUNT: 14.2 %
GFR SERPL CREATININE-BSD FRML MDRD: 121 ML/MIN/1.73SQ M
GLUCOSE SERPL-MCNC: 122 MG/DL (ref 70–99)
GLUCOSE UR STRIP-MCNC: NEGATIVE MG/DL
HCG SERPL QL: NEGATIVE
HCT VFR BLD AUTO: 46 % (ref 36–46)
HGB BLD-MCNC: 15.6 G/DL (ref 12–16)
HGB UR QL STRIP.AUTO: NEGATIVE
KETONES UR STRIP-MCNC: ABNORMAL MG/DL
LEUKOCYTE ESTERASE UR QL STRIP: 500
LYMPHOCYTES # BLD AUTO: 1.9 THOUSANDS/ΜL (ref 0.5–4)
LYMPHOCYTES NFR BLD AUTO: 22 % (ref 25–45)
MCH RBC QN AUTO: 30.2 PG (ref 26–34)
MCHC RBC AUTO-ENTMCNC: 33.9 G/DL (ref 31–36)
MCV RBC AUTO: 89 FL (ref 80–100)
MONOCYTES # BLD AUTO: 0.6 THOUSAND/ΜL (ref 0.2–0.9)
MONOCYTES NFR BLD AUTO: 7 % (ref 1–10)
MUCOUS THREADS UR QL AUTO: ABNORMAL
NEUTROPHILS # BLD AUTO: 6.2 THOUSANDS/ΜL (ref 1.8–7.8)
NEUTS SEG NFR BLD AUTO: 71 % (ref 45–65)
NITRITE UR QL STRIP: NEGATIVE
NON-SQ EPI CELLS URNS QL MICRO: ABNORMAL /HPF
P AXIS: 32 DEGREES
PH UR STRIP.AUTO: 6 [PH] (ref 4.5–8)
PLATELET # BLD AUTO: 181 THOUSANDS/UL (ref 150–450)
PMV BLD AUTO: 9 FL (ref 8.9–12.7)
POTASSIUM SERPL-SCNC: 3.8 MMOL/L (ref 3.6–5)
PR INTERVAL: 124 MS
PROT SERPL-MCNC: 8.9 G/DL (ref 5.9–8.4)
PROT UR STRIP-MCNC: NEGATIVE MG/DL
QRS AXIS: -2 DEGREES
QRSD INTERVAL: 82 MS
QT INTERVAL: 334 MS
QTC INTERVAL: 424 MS
RBC # BLD AUTO: 5.17 MILLION/UL (ref 4–5.2)
RBC #/AREA URNS AUTO: ABNORMAL /HPF
SODIUM SERPL-SCNC: 138 MMOL/L (ref 137–147)
SP GR UR STRIP.AUTO: 1.02 (ref 1–1.04)
T WAVE AXIS: 31 DEGREES
UROBILINOGEN UA: NEGATIVE MG/DL
VENTRICULAR RATE: 97 BPM
WBC # BLD AUTO: 8.8 THOUSAND/UL (ref 4.5–11)
WBC #/AREA URNS AUTO: ABNORMAL /HPF

## 2019-01-23 PROCEDURE — 93005 ELECTROCARDIOGRAM TRACING: CPT

## 2019-01-23 PROCEDURE — 36415 COLL VENOUS BLD VENIPUNCTURE: CPT | Performed by: PHYSICIAN ASSISTANT

## 2019-01-23 PROCEDURE — 84703 CHORIONIC GONADOTROPIN ASSAY: CPT | Performed by: PHYSICIAN ASSISTANT

## 2019-01-23 PROCEDURE — 81003 URINALYSIS AUTO W/O SCOPE: CPT | Performed by: PHYSICIAN ASSISTANT

## 2019-01-23 PROCEDURE — 80053 COMPREHEN METABOLIC PANEL: CPT | Performed by: PHYSICIAN ASSISTANT

## 2019-01-23 PROCEDURE — 84702 CHORIONIC GONADOTROPIN TEST: CPT | Performed by: PHYSICIAN ASSISTANT

## 2019-01-23 PROCEDURE — 85379 FIBRIN DEGRADATION QUANT: CPT | Performed by: PHYSICIAN ASSISTANT

## 2019-01-23 PROCEDURE — 96374 THER/PROPH/DIAG INJ IV PUSH: CPT

## 2019-01-23 PROCEDURE — 99284 EMERGENCY DEPT VISIT MOD MDM: CPT

## 2019-01-23 PROCEDURE — 85025 COMPLETE CBC W/AUTO DIFF WBC: CPT | Performed by: PHYSICIAN ASSISTANT

## 2019-01-23 PROCEDURE — 93010 ELECTROCARDIOGRAM REPORT: CPT | Performed by: INTERNAL MEDICINE

## 2019-01-23 PROCEDURE — 81001 URINALYSIS AUTO W/SCOPE: CPT | Performed by: PHYSICIAN ASSISTANT

## 2019-01-23 PROCEDURE — 87086 URINE CULTURE/COLONY COUNT: CPT | Performed by: PHYSICIAN ASSISTANT

## 2019-01-23 PROCEDURE — 96361 HYDRATE IV INFUSION ADD-ON: CPT

## 2019-01-23 RX ORDER — ONDANSETRON 2 MG/ML
4 INJECTION INTRAMUSCULAR; INTRAVENOUS ONCE
Status: COMPLETED | OUTPATIENT
Start: 2019-01-23 | End: 2019-01-23

## 2019-01-23 RX ORDER — IPRATROPIUM BROMIDE AND ALBUTEROL SULFATE 2.5; .5 MG/3ML; MG/3ML
SOLUTION RESPIRATORY (INHALATION)
Status: COMPLETED
Start: 2019-01-23 | End: 2019-01-23

## 2019-01-23 RX ADMIN — SODIUM CHLORIDE 1000 ML: 9 INJECTION, SOLUTION INTRAVENOUS at 12:40

## 2019-01-23 RX ADMIN — ONDANSETRON 4 MG: 2 INJECTION, SOLUTION INTRAMUSCULAR; INTRAVENOUS at 12:40

## 2019-01-23 NOTE — ED NOTES
Became anxious and nervous after telling her ex-bf that she was pregnant and he did not react in a positive way     Theresa Hinojosa RN  01/23/19 1924

## 2019-01-23 NOTE — DISCHARGE INSTRUCTIONS
Dysfunctional Uterine Bleeding   WHAT YOU NEED TO KNOW:   Dysfunctional uterine bleeding (DUB) is abnormal uterine bleeding that is caused by a problem with your hormones  You may have bleeding from your uterus at times other than your normal monthly period  Your monthly periods may last longer or shorter, and bleeding may be heavier or lighter than usual    DISCHARGE INSTRUCTIONS:   Medicines:   · Hormones  help decrease bleeding by making your monthly periods more regular  Sometimes this medicine may be given as birth control pills  · NSAIDs  help decrease swelling, pain, and fever  This medicine is available with or without a doctor's order  NSAIDs can cause stomach bleeding or kidney problems in certain people  If you take blood thinner medicine, always ask your healthcare provider if NSAIDs are safe for you  Always read the medicine label and follow directions  · Iron supplements  may be given if your blood iron level decreases because of heavy bleeding  Iron may make you constipated  Ask your healthcare provider for ways to prevent or treat constipation  Iron may also make your bowel movements turn dark or black  · Take your medicine as directed  Contact your healthcare provider if you think your medicine is not helping or if you have side effects  Tell him or her if you are allergic to any medicine  Keep a list of the medicines, vitamins, and herbs you take  Include the amounts, and when and why you take them  Bring the list or the pill bottles to follow-up visits  Carry your medicine list with you in case of an emergency  Follow up with your healthcare provider as directed:  Write down your questions so you remember to ask them during your visits  Self-care:   · Apply heat  on your lower abdomen for 20 to 30 minutes every 2 hours for as many days as directed  Heat helps decrease pain and muscle spasms  · Include foods high in iron  if needed   Examples of foods high in iron are leafy green vegetables, beef, pork, liver, eggs, and whole-grain breads and cereals  · Keep a diary of your menstrual cycles  Keep track of the number of tampons or pads you use each day  · Talk to your healthcare provider before you start a weight loss program   You may need to wait until the abnormal bleeding has stopped before you try to lose weight  The amount of iron in your blood should be normal before you lose weight  Contact your healthcare provider if:   · You need to change your sanitary pad or tampon more than once an hour  · Your medicine causes nausea, vomiting, or diarrhea  · You have questions or concerns about your condition or care  Return to the emergency department if:   · You continue to bleed heavily, or you feel faint  © 2017 [employer]  Information is for End User's use only and may not be sold, redistributed or otherwise used for commercial purposes  All illustrations and images included in CareNotes® are the copyrighted property of A [employer]  or Michael Garcia  The above information is an  only  It is not intended as medical advice for individual conditions or treatments  Talk to your doctor, nurse or pharmacist before following any medical regimen to see if it is safe and effective for you

## 2019-01-23 NOTE — ED NOTES
Pt states that she has not taken any meds since she found out she was pregnant     Randy Guevara, HUMBERTO  01/23/19 2843

## 2019-01-23 NOTE — ED PROVIDER NOTES
History  Chief Complaint   Patient presents with    Syncope     pt chief complaint of anxiety/panic attack today and then had witnessed syncopal episode, fell backwards into wall and then into sitting postion  denies any pain/injury  also states she took preg  test 1 week ago (+)  started heavy vag  bleeding yesterday     19 yo F with PMH PCOS, adhd and bipolar BIBEMS after syncopal episode  Pt reports that 1 week ago she had positive pregnancy test at home  Pt reports LMP was 6 weeks prior but does admit to irregular periods  Patient reports yesterday spoke to her ex-boyfriend about positive pregnancy test who had told her 'its a bad idea to have a child together'  Patient states that later in the day after telling her ex-boyfriend that she had a positive pregnancy test she proceeded to have vaginal bleeding where she went through approximately 4 pads in 1 hour  She states that she texted her ex-boyfriend again stating that she likely had miscarriage as this was similar to her 2 previous miscarriages and her ex-boyfriend responded stating 'better off that way in the long run'  Patient states that she was very upset from the statements that he made  And today was thinking about the conversation and proceeded to fall in the kitchen  She states she fell backwards hitting her back against the wall and slid down the wall of the kitchen  There was no LOC  This was witnessed by mother and father  Mother at bedside states that she ran over to the patient who then became responsive  Patient denies any history headache dizziness chest pain shortness of breath abdominal pain nausea vomiting or diarrhea  She reports no vaginal bleeding or urinary symptoms today              None       Past Medical History:   Diagnosis Date    ADHD (attention deficit hyperactivity disorder)     Bipolar 1 disorder (HCC)     PCOS (polycystic ovarian syndrome) 2015       Past Surgical History:   Procedure Laterality Date    NO PAST SURGERIES         Family History   Problem Relation Age of Onset    Cancer Mother         cervical    Breast cancer Neg Hx      I have reviewed and agree with the history as documented  Social History   Substance Use Topics    Smoking status: Never Smoker    Smokeless tobacco: Never Used    Alcohol use Yes      Comment: 2-3x/month        Review of Systems   All other systems reviewed and are negative  Physical Exam  Physical Exam   Constitutional: She is oriented to person, place, and time  She appears well-nourished  No distress  obese   HENT:   Head: Normocephalic and atraumatic  Right Ear: External ear normal    Left Ear: External ear normal    + facial hair   Eyes: Pupils are equal, round, and reactive to light  Conjunctivae and EOM are normal    Neck: Normal range of motion  Neck supple  No JVD present  Cardiovascular: Regular rhythm  tachycardic   Pulmonary/Chest: Effort normal  No respiratory distress  She has no wheezes  She has no rales  Abdominal: Soft  There is no tenderness  Musculoskeletal:   FROM, steady gait, cap refill brisk, strength and sensation grossly intact throughout   Lymphadenopathy:     She has no cervical adenopathy  Neurological: She is alert and oriented to person, place, and time  No cranial nerve deficit or sensory deficit  She exhibits normal muscle tone  Skin: Skin is warm and dry  Capillary refill takes less than 2 seconds  Psychiatric: She has a normal mood and affect  Her behavior is normal    Nursing note and vitals reviewed        Vital Signs  ED Triage Vitals   Temperature Pulse Respirations Blood Pressure SpO2   01/23/19 1159 01/23/19 1159 01/23/19 1159 01/23/19 1159 01/23/19 1159   98 9 °F (37 2 °C) (!) 110 16 138/95 96 %      Temp Source Heart Rate Source Patient Position - Orthostatic VS BP Location FiO2 (%)   01/23/19 1159 01/23/19 1430 01/23/19 1159 01/23/19 1159 --   Tympanic Monitor Lying Left arm       Pain Score       01/23/19 1430 No Pain           Vitals:    01/23/19 1159 01/23/19 1430   BP: 138/95 141/76   Pulse: (!) 110 83   Patient Position - Orthostatic VS: Lying Sitting       Visual Acuity      ED Medications  Medications   ipratropium-albuterol (DUO-NEB) 0 5-2 5 mg/3 mL inhalation solution **ADS Override Pull** (  Given to EMS 1/23/19 1157)   sodium chloride 0 9 % bolus 1,000 mL (0 mL Intravenous Stopped 1/23/19 1340)   ondansetron (ZOFRAN) injection 4 mg (4 mg Intravenous Given 1/23/19 1240)       Diagnostic Studies  Results Reviewed     Procedure Component Value Units Date/Time    Urine Microscopic [[id number]]  (Abnormal) Collected:  01/23/19 1325    Lab Status:  Final result Specimen:  Urine from Urine, Other Updated:  01/23/19 1419     RBC, UA 0-1 (A) /hpf      WBC, UA 10-20 (A) /hpf      Epithelial Cells Moderate (A) /hpf      Bacteria, UA Moderate (A) /hpf      MUCUS THREADS Occasional (A)    Urine culture [[id number]] Collected:  01/23/19 1325    Lab Status:   In process Specimen:  Urine from Urine, Other Updated:  01/23/19 1418    UA w Reflex to Microscopic w Reflex to Culture [[id number]]  (Abnormal) Collected:  01/23/19 1325    Lab Status:  Final result Specimen:  Urine from Urine, Other Updated:  01/23/19 1404     Color, UA Ana Laura (A)     Clarity, UA Slightly Cloudy (A)     Specific Gravity, UA 1 020     pH, UA 6 0     Leukocytes,  0 (A)     Nitrite, UA Negative     Protein, UA Negative mg/dl      Glucose, UA Negative mg/dl      Ketones, UA 5 (Trace) (A) mg/dl      Bilirubin, UA Negative     Blood, UA Negative     UROBILINOGEN UA Negative mg/dL     hCG, qualitative pregnancy [[id number]]  (Normal) Collected:  01/23/19 1234    Lab Status:  Final result Specimen:  Blood from Arm, Right Updated:  01/23/19 1312     Preg, Serum Negative    hCG, quantitative [[id number]]  (Normal) Collected:  01/23/19 1234    Lab Status:  Final result Specimen:  Blood from Arm, Right Updated:  01/23/19 1312     HCG, Quant <3 mIU/mL Narrative:         Pregnant Gestational Age           HCG Range (mIU/mL)  4 weeks                              44 - 6952  5 weeks                              348 - 85552        6 - 7 weeks                          975 - 871302  8 - 10 weeks                         60458 - 655869  11 - 12 weeks                        03348 - 474060  13 - 27 weeks (2nd Trimester)        5016 - 448461  28 - 40 weeks (3rd Trimester)        1531 - 950697                 D-dimer, quantitative [[id number]]  (Normal) Collected:  01/23/19 1235    Lab Status:  Final result Specimen:  Blood from Arm, Right Updated:  01/23/19 1306     D-DIMER 0 19 mg/L     Narrative:       D-DIMER:      Comprehensive metabolic panel [[id number]]  (Abnormal) Collected:  01/23/19 1234    Lab Status:  Final result Specimen:  Blood from Arm, Right Updated:  01/23/19 1257     Sodium 138 mmol/L      Potassium 3 8 mmol/L      Chloride 101 mmol/L      CO2 26 mmol/L      ANION GAP 11 mmol/L      BUN 13 mg/dL      Creatinine 0 69 mg/dL      Glucose 122 (H) mg/dL      Calcium 9 8 mg/dL      AST 44 (H) U/L      ALT 42 U/L      Alkaline Phosphatase 81 U/L      Total Protein 8 9 (H) g/dL      Albumin 5 0 g/dL      Total Bilirubin 0 80 mg/dL      eGFR 121 ml/min/1 73sq m     Narrative:         National Kidney Disease Education Program recommendations are as follows:  GFR calculation is accurate only with a steady state creatinine  Chronic Kidney disease less than 60 ml/min/1 73 sq  meters  Kidney failure less than 15 ml/min/1 73 sq  meters      CBC and differential [[id number]]  (Abnormal) Collected:  01/23/19 1234    Lab Status:  Final result Specimen:  Blood from Arm, Right Updated:  01/23/19 1247     WBC 8 80 Thousand/uL      RBC 5 17 Million/uL      Hemoglobin 15 6 g/dL      Hematocrit 46 0 %      MCV 89 fL      MCH 30 2 pg      MCHC 33 9 g/dL      RDW 14 2 %      MPV 9 0 fL      Platelets 364 Thousands/uL      Neutrophils Relative 71 (H) %      Lymphocytes Relative 22 (L) % Monocytes Relative 7 %      Eosinophils Relative 0 %      Basophils Relative 1 %      Neutrophils Absolute 6 20 Thousands/µL      Lymphocytes Absolute 1 90 Thousands/µL      Monocytes Absolute 0 60 Thousand/µL      Eosinophils Absolute 0 00 Thousand/µL      Basophils Absolute 0 00 Thousands/µL                  US OB < 14 weeks single or first gestation level 1    (Results Pending)              Procedures  Procedures       Phone Contacts  ED Phone Contact    ED Course  ED Course as of Jan 23 1443   Wed Jan 23, 2019   1323 Pt has negative pregnancy test, will d/c OB u/s    1328 She reports improvement with fluids and zofran here                [address] for PE      Most Recent Value   PERC Rule for PE   Age >=50  0 Filed at: 01/23/2019 1233   HR >=100  1 Filed at: 01/23/2019 1233   O2 Sat on room air < 95%  0 Filed at: 01/23/2019 1233   History of PE or DVT  0 Filed at: 01/23/2019 1233   Recent trauma or surgery  0 Filed at: 01/23/2019 1233   Hemoptysis  0 Filed at: 01/23/2019 1233   Exogenous estrogen  0 Filed at: 01/23/2019 1233   Unilateral leg swelling  0 Filed at: 01/23/2019 1233   [address] for PE Results  1 Filed at: 01/23/2019 1233                Wells' Criteria for PE      Most Recent Value   Wells' Criteria for PE   Clinical signs and symptoms of DVT  0 Filed at: 01/23/2019 1232   PE is primary diagnosis or equally likely  0 Filed at: 01/23/2019 1232   HR >100  1 5 Filed at: 01/23/2019 1232   Immobilization at least 3 days or Surgery in the previous 4 weeks  0 Filed at: 01/23/2019 1232   Previous, objectively diagnosed PE or DVT  0 Filed at: 01/23/2019 1232   Hemoptysis  0 Filed at: 01/23/2019 1232   Malignancy with treatment within 6 months or palliative  0 Filed at: 01/23/2019 1232   Wells' Criteria Total  1 5 Filed at: 01/23/2019 1232            MDM  Number of Diagnoses or Management Options  Syncopal episodes:   Vaginal bleeding:   Diagnosis management comments: 49-year-old female brought in by EMS for syncopal episode and vaginal bleeding, pt was found to have negative preg and hcg <3 here, no complaints of vaginal bleeding today, pt hgb WNL, electrolytes WNL, she felt improved with fluids in the ED, ekg normal, likely a vasovagal event 2/2 anxiety vs emotional stress, pt states she wants to go home at this time and will f/u with obgyn and pcp outpatient    All labs discussed with patient, strict return to ED precautions discussed  Pt verbalizes understanding and agrees with plan  Pt is stable for discharge    Portions of the record may have been created with voice recognition software  Occasional wrong word or "sound a like" substitutions may have occurred due to the inherent limitations of voice recognition software  Read the chart carefully and recognize, using context, where substitutions have occurred  CritCare Time    Disposition  Final diagnoses:   Vaginal bleeding   Vasovagal near syncope     Time reflects when diagnosis was documented in both MDM as applicable and the Disposition within this note     Time User Action Codes Description Comment    1/23/2019  2:07 PM Leotus Add [N93 9] Vaginal bleeding     1/23/2019  2:08 PM Merly SCHUMACHER Add [R55] Syncopal episodes     1/23/2019  2:43 PM Tanisha Congress Add [R55] Vasovagal near syncope     1/23/2019  2:43 PM Beth Garrett [R55] Syncopal episodes       ED Disposition     ED Disposition Condition Comment    Discharge  [facility] discharge to home/self care  Condition at discharge: Good        Follow-up Information     Follow up With Specialties Details Why Mellissa 298, DO Family Medicine Schedule an appointment as soon as possible for a visit As needed [facility] [address]  [address] [facility]  [place] [address] [facility]  [phone]      Your OBGYN  Schedule an appointment as soon as possible for a visit As needed           There are no discharge medications for this patient      No discharge procedures on file     ED Provider  Electronically Signed by           Ketty Ac PA-C  01/23/19 5654

## 2019-01-23 NOTE — ED PROCEDURE NOTE
PROCEDURE  ECG 12 Lead Documentation  Date/Time: 1/23/2019 12:11 PM  Performed by: Yanna Myers by: Abe Moralez     ECG reviewed by me, the ED Provider: yes    Patient location:  ED  Previous ECG:     Previous ECG:  Compared to current    Similarity:  No change  Interpretation:     Interpretation: normal    Rate:     ECG rate assessment: normal    Rhythm:     Rhythm: sinus rhythm    Ectopy:     Ectopy: none    QRS:     QRS axis:  Normal    QRS intervals:  Normal  Conduction:     Conduction: normal    ST segments:     ST segments:  Normal  T waves:     T waves: normal           Amie Whitlock DO  01/23/19 1211

## 2019-01-24 LAB — BACTERIA UR CULT: NORMAL

## 2019-03-04 ENCOUNTER — HOSPITAL ENCOUNTER (EMERGENCY)
Facility: HOSPITAL | Age: 24
Discharge: HOME | End: 2019-03-04
Attending: EMERGENCY MEDICINE | Admitting: EMERGENCY MEDICINE
Payer: COMMERCIAL

## 2019-03-04 VITALS
SYSTOLIC BLOOD PRESSURE: 135 MMHG | BODY MASS INDEX: 43.67 KG/M2 | DIASTOLIC BLOOD PRESSURE: 73 MMHG | HEART RATE: 78 BPM | TEMPERATURE: 97.8 F | WEIGHT: 262.13 LBS | HEIGHT: 65 IN | RESPIRATION RATE: 18 BRPM | OXYGEN SATURATION: 95 %

## 2019-03-04 DIAGNOSIS — G43.909 MIGRAINE: Primary | ICD-10-CM

## 2019-03-04 LAB — EXT PREG TEST URINE: NORMAL

## 2019-03-04 PROCEDURE — 96365 THER/PROPH/DIAG IV INF INIT: CPT

## 2019-03-04 PROCEDURE — 81025 URINE PREGNANCY TEST: CPT | Performed by: PHYSICIAN ASSISTANT

## 2019-03-04 PROCEDURE — 96375 TX/PRO/DX INJ NEW DRUG ADDON: CPT

## 2019-03-04 PROCEDURE — 99283 EMERGENCY DEPT VISIT LOW MDM: CPT

## 2019-03-04 PROCEDURE — 96361 HYDRATE IV INFUSION ADD-ON: CPT

## 2019-03-04 RX ORDER — KETOROLAC TROMETHAMINE 30 MG/ML
30 INJECTION, SOLUTION INTRAMUSCULAR; INTRAVENOUS ONCE
Status: COMPLETED | OUTPATIENT
Start: 2019-03-04 | End: 2019-03-04

## 2019-03-04 RX ORDER — MAGNESIUM SULFATE HEPTAHYDRATE 40 MG/ML
2 INJECTION, SOLUTION INTRAVENOUS ONCE
Status: COMPLETED | OUTPATIENT
Start: 2019-03-04 | End: 2019-03-04

## 2019-03-04 RX ORDER — ACETAMINOPHEN, ASPIRIN AND CAFFEINE 250; 250; 65 MG/1; MG/1; MG/1
2 TABLET, FILM COATED ORAL AS NEEDED
COMMUNITY
End: 2019-11-06 | Stop reason: HOSPADM

## 2019-03-04 RX ORDER — METOCLOPRAMIDE HYDROCHLORIDE 5 MG/ML
10 INJECTION INTRAMUSCULAR; INTRAVENOUS ONCE
Status: COMPLETED | OUTPATIENT
Start: 2019-03-04 | End: 2019-03-04

## 2019-03-04 RX ORDER — DIPHENHYDRAMINE HYDROCHLORIDE 50 MG/ML
25 INJECTION INTRAMUSCULAR; INTRAVENOUS ONCE
Status: COMPLETED | OUTPATIENT
Start: 2019-03-04 | End: 2019-03-04

## 2019-03-04 RX ADMIN — METOCLOPRAMIDE 10 MG: 5 INJECTION, SOLUTION INTRAMUSCULAR; INTRAVENOUS at 11:08

## 2019-03-04 RX ADMIN — DIPHENHYDRAMINE HYDROCHLORIDE 25 MG: 50 INJECTION INTRAMUSCULAR; INTRAVENOUS at 11:07

## 2019-03-04 RX ADMIN — SODIUM CHLORIDE 1000 ML: 9 INJECTION, SOLUTION INTRAVENOUS at 11:05

## 2019-03-04 RX ADMIN — MAGNESIUM SULFATE IN WATER 2 G: 40 INJECTION, SOLUTION INTRAVENOUS at 11:15

## 2019-03-04 RX ADMIN — KETOROLAC TROMETHAMINE 30 MG: 30 INJECTION, SOLUTION INTRAMUSCULAR; INTRAVENOUS at 11:06

## 2019-03-04 NOTE — ED PROVIDER NOTES
History  Chief Complaint   Patient presents with    Headache - Recurrent or Known Dx Migraines     Patient reports migraine headache starting around 5am this morning, took her migraine medication without relief  Patient is a 25-year-old female past medical history significant for migraines, PCOS, bipolar 1 disorder who presents today with chief complaint of migraine  Patient reports migraine is similar to past headaches and began at 5:00 a m  This morning on the left side  Patient reports she took Excedrin however has had no relief in her symptoms  Patient reports no new symptoms associated with her migraine and denies any numbness, tingling, weakness, paresthesias, vision changes associated with her migraine  Patient reports she has had recent stressful events in her life and believes this to be the cause of the migraine today  Patient denies any fevers, chills, head trauma, neck stiffness neck pain or rashes  History provided by:  Patient  Headache - Recurrent or Known Dx Migraines   Pain location:  L parietal and L temporal  Quality:  Dull  Radiates to:  Does not radiate  Onset quality:  Gradual  Duration:  5 hours  Timing:  Constant  Progression:  Unchanged  Chronicity:  Chronic  Similar to prior headaches: yes    Context: bright light and loud noise    Worsened by:  Light and sound  Ineffective treatments:  Acetaminophen  Associated symptoms: no abdominal pain, no congestion, no dizziness, no ear pain, no fatigue, no fever, no nausea, no numbness, no sore throat and no vomiting        Prior to Admission Medications   Prescriptions Last Dose Informant Patient Reported?  Taking?   aspirin-acetaminophen-caffeine (Flo Quinones) 250-250-65 MG per tablet   Yes Yes   Sig: Take 2 tablets by mouth as needed for headaches      Facility-Administered Medications: None       Past Medical History:   Diagnosis Date    ADHD (attention deficit hyperactivity disorder)     Bipolar 1 disorder (HCC)     PCOS (polycystic ovarian syndrome) 2015       Past Surgical History:   Procedure Laterality Date    NO PAST SURGERIES         Family History   Problem Relation Age of Onset    Cancer Mother         cervical    Breast cancer Neg Hx      I have reviewed and agree with the history as documented  Social History     Tobacco Use    Smoking status: Former Smoker    Smokeless tobacco: Never Used   Substance Use Topics    Alcohol use: Yes     Comment: 2-3x/month    Drug use: No        Review of Systems   Constitutional: Negative for chills, fatigue and fever  HENT: Negative for congestion, ear pain, rhinorrhea and sore throat  Eyes: Negative for redness  Respiratory: Negative for chest tightness and shortness of breath  Cardiovascular: Negative for chest pain and palpitations  Gastrointestinal: Negative for abdominal pain, nausea and vomiting  Genitourinary: Negative for dysuria and hematuria  Musculoskeletal: Negative  Skin: Negative for rash  Neurological: Positive for headaches  Negative for dizziness, syncope, light-headedness and numbness  Physical Exam  Physical Exam   Constitutional: She is oriented to person, place, and time  She appears well-developed and well-nourished  HENT:   Head: Normocephalic  Eyes: No scleral icterus  Cardiovascular: Normal rate and regular rhythm  Pulmonary/Chest: Effort normal and breath sounds normal  No stridor  Abdominal: Soft  She exhibits no distension  There is no tenderness  Musculoskeletal: Normal range of motion  Neurological: She is alert and oriented to person, place, and time  No cranial nerve deficit  She exhibits normal muscle tone  Coordination normal    Skin: Skin is warm and dry  Capillary refill takes less than 2 seconds  Psychiatric: She has a normal mood and affect  Nursing note and vitals reviewed        Vital Signs  ED Triage Vitals [03/04/19 1020]   Temperature Pulse Respirations Blood Pressure SpO2   (!) 97 1 °F (36 2 °C) 91 17 143/86 96 %      Temp Source Heart Rate Source Patient Position - Orthostatic VS BP Location FiO2 (%)   Tympanic Monitor Sitting Left arm --      Pain Score       8           Vitals:    03/04/19 1020   BP: 143/86   Pulse: 91   Patient Position - Orthostatic VS: Sitting       Visual Acuity  Visual Acuity      Most Recent Value   L Pupil Size (mm)  3   R Pupil Size (mm)  3          ED Medications  Medications   sodium chloride 0 9 % bolus 1,000 mL (1,000 mL Intravenous New Bag 3/4/19 1105)   diphenhydrAMINE (BENADRYL) injection 25 mg (25 mg Intravenous Given 3/4/19 1107)   metoclopramide (REGLAN) injection 10 mg (10 mg Intravenous Given 3/4/19 1108)   ketorolac (TORADOL) injection 30 mg (30 mg Intravenous Given 3/4/19 1106)   magnesium sulfate 2 g/50 mL IVPB (premix) 2 g (0 g Intravenous Stopped 3/4/19 1159)       Diagnostic Studies  Results Reviewed     Procedure Component Value Units Date/Time    POCT pregnancy, urine [[id number]]  (Normal) Resulted:  03/04/19 1055    Lab Status:  Final result Updated:  03/04/19 1056     EXT PREG TEST UR (Ref: Negative) pregnancy test is negative                 No orders to display              Procedures  Procedures       Phone Contacts  ED Phone Contact    ED Course  ED Course as of Mar 04 1222   Mon Mar 04, 2019   1221 Patient reports significant decrease in headache symptoms reports she feels comfortable for discharge at this time despite not finishing the entire L of IV fluids  Return to emergency department criteria was reviewed with the patient, patient verbalizes understanding                                      MDM    Disposition  Final diagnoses:   Migraine     Time reflects when diagnosis was documented in both MDM as applicable and the Disposition within this note     Time User Action Codes Description Comment    3/4/2019 12:20 PM Marita Castillo [[id number]] Migraine       ED Disposition     ED Disposition Condition Date/Time Comment    Discharge Good Mon Mar 4, 2019 12:20 PM Vaibhav Montes De Oca discharge to home/self care  Follow-up Information     Follow up With Specialties Details Why Mellissa 298, DO Family Medicine Schedule an appointment as soon as possible for a visit in 2 days As needed [address] [place] [address]  [address] [place] [address]  [phone]            Patient's Medications   Discharge Prescriptions    No medications on file     No discharge procedures on file      ED Provider  Electronically Signed by           Sharon Fernandez PA-C  03/04/19 9493

## 2019-03-12 ENCOUNTER — APPOINTMENT (EMERGENCY)
Dept: CT IMAGING | Facility: HOSPITAL | Age: 24
End: 2019-03-12
Payer: COMMERCIAL

## 2019-03-12 ENCOUNTER — HOSPITAL ENCOUNTER (EMERGENCY)
Facility: HOSPITAL | Age: 24
Discharge: HOME | End: 2019-03-12
Attending: EMERGENCY MEDICINE | Admitting: EMERGENCY MEDICINE
Payer: COMMERCIAL

## 2019-03-12 VITALS
TEMPERATURE: 97.2 F | SYSTOLIC BLOOD PRESSURE: 147 MMHG | DIASTOLIC BLOOD PRESSURE: 80 MMHG | RESPIRATION RATE: 18 BRPM | BODY MASS INDEX: 42.56 KG/M2 | OXYGEN SATURATION: 98 % | HEART RATE: 91 BPM | WEIGHT: 255.73 LBS

## 2019-03-12 DIAGNOSIS — S16.1XXA STRAIN OF NECK MUSCLE, INITIAL ENCOUNTER: ICD-10-CM

## 2019-03-12 DIAGNOSIS — S09.90XA CLOSED HEAD INJURY, INITIAL ENCOUNTER: Primary | ICD-10-CM

## 2019-03-12 LAB — EXT PREG TEST URINE: NEGATIVE

## 2019-03-12 PROCEDURE — 72125 CT NECK SPINE W/O DYE: CPT

## 2019-03-12 PROCEDURE — 81025 URINE PREGNANCY TEST: CPT | Performed by: EMERGENCY MEDICINE

## 2019-03-12 PROCEDURE — 99284 EMERGENCY DEPT VISIT MOD MDM: CPT

## 2019-03-12 PROCEDURE — 70450 CT HEAD/BRAIN W/O DYE: CPT

## 2019-03-12 PROCEDURE — 96372 THER/PROPH/DIAG INJ SC/IM: CPT

## 2019-03-12 RX ORDER — TRAMADOL HYDROCHLORIDE 50 MG/1
50 TABLET ORAL EVERY 6 HOURS PRN
Qty: 12 TABLET | Refills: 0 | Status: SHIPPED | OUTPATIENT
Start: 2019-03-12 | End: 2019-03-22

## 2019-03-12 RX ORDER — KETOROLAC TROMETHAMINE 30 MG/ML
30 INJECTION, SOLUTION INTRAMUSCULAR; INTRAVENOUS ONCE
Status: COMPLETED | OUTPATIENT
Start: 2019-03-12 | End: 2019-03-12

## 2019-03-12 RX ADMIN — KETOROLAC TROMETHAMINE 30 MG: 30 INJECTION, SOLUTION INTRAMUSCULAR; INTRAVENOUS at 12:54

## 2019-03-12 NOTE — ED PROVIDER NOTES
History  Chief Complaint   Patient presents with   Tessy Monzon Fall     states 1 week ago slipped on ice, fell backwards hitting back of head with (+)LOC   states increasing pain in back of head and neck, headaches, dizziness and fatigue     Patient is a 78-year-old female complaining a one-week history right sided posterior neck pain and intermittent dizzy spells for since she slipped and fell on ice on 03/04/2019  Patient states she did hit her head had no loss of conscious but has a constant achy pain in the right posterior neck that does not radiate worse with any attempted movement of the neck to the right  Better with rest   Denies any numbness tingling or weakness  States she gets dizzy episodes anywhere from every 5-15 minutes at last few meds resolved on their own  Denies any nausea vomiting  States that she has been taking anywhere from 5-6 Advil at 1 time daily which have not helped because she is immune to Motrin    Did not call her doctor because she does not have 1 yet  Prior to Admission Medications   Prescriptions Last Dose Informant Patient Reported? Taking?   aspirin-acetaminophen-caffeine ([address] [place] [address]) 250-250-65 MG per tablet   Yes No   Sig: Take 2 tablets by mouth as needed for headaches      Facility-Administered Medications: None       Past Medical History:   Diagnosis Date    ADHD (attention deficit hyperactivity disorder)     Bipolar 1 disorder (HCC)     PCOS (polycystic ovarian syndrome) 2015       Past Surgical History:   Procedure Laterality Date    NO PAST SURGERIES         Family History   Problem Relation Age of Onset    Cancer Mother         cervical    Breast cancer Neg Hx      I have reviewed and agree with the history as documented      Social History     Tobacco Use    Smoking status: Former Smoker    Smokeless tobacco: Never Used   Substance Use Topics    Alcohol use: Yes     Comment: 2-3x/month    Drug use: No        Review of Systems   Constitutional: Negative  Negative for activity change, appetite change and fever  HENT: Negative  Eyes: Negative  Respiratory: Negative  Negative for cough and choking  Cardiovascular: Negative  Negative for chest pain  Gastrointestinal: Negative  Negative for abdominal pain, diarrhea, nausea and vomiting  Endocrine: Negative  Genitourinary: Negative  Musculoskeletal: Positive for neck pain  Skin: Negative  Allergic/Immunologic: Negative  Neurological: Positive for dizziness  Hematological: Negative  Psychiatric/Behavioral: Negative  All other systems reviewed and are negative  Physical Exam  Physical Exam   Constitutional: She is oriented to person, place, and time  She appears well-developed and well-nourished  HENT:   Head: Normocephalic  Right Ear: External ear normal    Left Ear: External ear normal    Nose: Nose normal    Mouth/Throat: Oropharynx is clear and moist    Patient with tenderness to the right occiput  No hemotympanum no septal hematoma  No orbital tenderness palpation no malocclusion  Eyes: Pupils are equal, round, and reactive to light  Conjunctivae and EOM are normal    Neck:   Patient with tenderness to the right posterior neck  No midline tenderness palpation no bony tenderness or step-off  Limited range of motion to the right secondary to pain  Cardiovascular: Normal rate, regular rhythm, normal heart sounds and intact distal pulses  Pulmonary/Chest: Effort normal and breath sounds normal    Abdominal: Soft  Bowel sounds are normal    Musculoskeletal: Normal range of motion  Neurological: She is alert and oriented to person, place, and time  She displays normal reflexes  No cranial nerve deficit or sensory deficit  She exhibits normal muscle tone  Coordination normal    Skin: Skin is warm and dry  Capillary refill takes less than 2 seconds  Psychiatric: She has a normal mood and affect   Her behavior is normal    Nursing note and vitals reviewed  Vital Signs  ED Triage Vitals   Temperature Pulse Respirations Blood Pressure SpO2   03/12/19 1229 03/12/19 1229 03/12/19 1229 03/12/19 1229 03/12/19 1229   (!) 97 2 °F (36 2 °C) 87 16 149/93 99 %      Temp Source Heart Rate Source Patient Position - Orthostatic VS BP Location FiO2 (%)   03/12/19 1229 03/12/19 1229 03/12/19 1229 03/12/19 1229 --   Tympanic Monitor Sitting Left arm       Pain Score       03/12/19 1254       6           Vitals:    03/12/19 1229   BP: 149/93   Pulse: 87   Patient Position - Orthostatic VS: Sitting       qSOFA     Row Name 03/12/19 1230 03/12/19 1229             Altered mental status GCS < 15  0  --       Respiratory Rate > / =22  --  0       Systolic BP < / =108  --  0       Q Sofa Score  0  0             Visual Acuity  Visual Acuity      Most Recent Value   L Pupil Size (mm)  3   R Pupil Size (mm)  3          ED Medications  Medications   ketorolac (TORADOL) injection 30 mg (30 mg Intramuscular Given 3/12/19 1254)       Diagnostic Studies  Results Reviewed     Procedure Component Value Units Date/Time    POCT pregnancy, urine [[id number]]  (Normal) Resulted:  03/12/19 1251    Lab Status:  Final result Updated:  03/12/19 1251     EXT PREG TEST UR (Ref: Negative) negative                 CT head without contrast    (Results Pending)   CT spine cervical without contrast    (Results Pending)              Procedures  Procedures       Phone Contacts  ED Phone Contact    ED Course  ED Course as of Mar 12 1622   Tue Mar 12, 2019   1420 One over CT  Results with patient  Will follow up with Lita 41 will give a work note for today as well as pain medication stressed patient importance of following directions on prescription instructions  Return to the ER for any concerns                                    MDM  Number of Diagnoses or Management Options  Closed head injury, initial encounter:   Strain of neck muscle, initial encounter:      Amount and/or Complexity of Data Reviewed  Tests in the radiology section of CPT®: ordered and reviewed        Disposition  Final diagnoses:   None     ED Disposition     None      Follow-up Information    None         Patient's Medications   Discharge Prescriptions    No medications on file     No discharge procedures on file      ED Provider  Electronically Signed by           Janette Rosenberg MD  03/12/19 1061

## 2019-03-13 ENCOUNTER — APPOINTMENT (EMERGENCY)
Dept: RADIOLOGY | Facility: HOSPITAL | Age: 24
End: 2019-03-13
Payer: COMMERCIAL

## 2019-03-13 ENCOUNTER — HOSPITAL ENCOUNTER (EMERGENCY)
Facility: HOSPITAL | Age: 24
Discharge: HOME | End: 2019-03-13
Attending: EMERGENCY MEDICINE
Payer: COMMERCIAL

## 2019-03-13 VITALS
TEMPERATURE: 98.4 F | OXYGEN SATURATION: 99 % | WEIGHT: 265.88 LBS | DIASTOLIC BLOOD PRESSURE: 81 MMHG | BODY MASS INDEX: 44.24 KG/M2 | SYSTOLIC BLOOD PRESSURE: 128 MMHG | RESPIRATION RATE: 18 BRPM | HEART RATE: 86 BPM

## 2019-03-13 DIAGNOSIS — M25.511 RIGHT SHOULDER PAIN: Primary | ICD-10-CM

## 2019-03-13 PROCEDURE — 99283 EMERGENCY DEPT VISIT LOW MDM: CPT

## 2019-03-13 PROCEDURE — 73030 X-RAY EXAM OF SHOULDER: CPT

## 2019-03-13 RX ORDER — IBUPROFEN 600 MG/1
600 TABLET ORAL ONCE
Status: COMPLETED | OUTPATIENT
Start: 2019-03-13 | End: 2019-03-13

## 2019-03-13 RX ADMIN — IBUPROFEN 600 MG: 600 TABLET ORAL at 22:18

## 2019-03-14 NOTE — ED PROVIDER NOTES
Pt Name: Krystin Torres  MRN: [id number]  [place] 1995  Age/Sex: 20 y o  female  Date of evaluation: 3/13/2019  PCP: Olga Wyatt, [address] [place] [address]    Chief Complaint   Patient presents with    Arm Pain     reports arm popped out of socket at [phone] 04, remains out of socket  HPI    Taylor Roge presents to the Emergency Department complaining of right shoulder pain  She reports that she felt it pop out and feels like she can't move it  No trauma  Cannot explain how that happened  HPI      Past Medical and Surgical History    Past Medical History:   Diagnosis Date    ADHD (attention deficit hyperactivity disorder)     Bipolar 1 disorder ([address] )     PCOS (polycystic ovarian syndrome) 2015       Past Surgical History:   Procedure Laterality Date    NO PAST SURGERIES         Family History   Problem Relation Age of Onset    Cancer Mother         cervical    Breast cancer Neg Hx        Social History     Tobacco Use    Smoking status: Former Smoker    Smokeless tobacco: Never Used   Substance Use Topics    Alcohol use: Yes     Comment: 2-3x/month    Drug use: No              Allergies    No Known Allergies    Home Medications    Prior to Admission medications    Medication Sig Start Date End Date Taking? Authorizing Provider   aspirin-acetaminophen-caffeine (EXCEDRIN MIGRAINE) 247-876-38 MG per tablet Take 2 tablets by mouth as needed for headaches    Historical Provider, MD   traMADol (ULTRAM) 50 mg tablet Take 1 tablet (50 mg total) by mouth every 6 (six) hours as needed for moderate pain for up to 10 days 3/12/19 3/22/19  Rhona Arias MD           Review of Systems    Review of Systems   Constitutional: Negative for activity change, appetite change, chills, diaphoresis, fatigue and fever  HENT: Negative for congestion, postnasal drip, rhinorrhea, sinus pressure, sneezing and sore throat  Eyes: Negative for pain and visual disturbance     Respiratory: Negative for cough, chest tightness and shortness of breath  Cardiovascular: Negative for chest pain, palpitations and leg swelling  Gastrointestinal: Negative for abdominal distention, abdominal pain, constipation, diarrhea, nausea and vomiting  Endocrine: Negative for polydipsia, polyphagia and polyuria  Genitourinary: Negative for decreased urine volume, difficulty urinating, dysuria, flank pain, frequency and hematuria  Musculoskeletal: Negative for arthralgias, gait problem, joint swelling and neck pain  Skin: Negative for pallor and rash  Allergic/Immunologic: Negative for immunocompromised state  Neurological: Negative for syncope, speech difficulty, weakness, light-headedness, numbness and headaches  All other systems reviewed and are negative  All other systems reviewed and negative  Physical Exam      ED Triage Vitals [03/13/19 2038]   Temperature Pulse Respirations Blood Pressure SpO2   98 4 °F (36 9 °C) 86 18 128/81 99 %      Temp Source Heart Rate Source Patient Position - Orthostatic VS BP Location FiO2 (%)   Tympanic Monitor Sitting Left arm --      Pain Score       No Pain               Physical Exam   Constitutional: She is oriented to person, place, and time  She appears well-developed and well-nourished  No distress  HENT:   Head: Normocephalic and atraumatic  Nose: Nose normal    Mouth/Throat: Oropharynx is clear and moist    Eyes: Pupils are equal, round, and reactive to light  Conjunctivae, EOM and lids are normal    Neck: Normal range of motion  Neck supple  Cardiovascular: Normal rate, regular rhythm and normal heart sounds  Exam reveals no gallop and no friction rub  No murmur heard  Pulmonary/Chest: Effort normal and breath sounds normal  No accessory muscle usage  No respiratory distress  She has no wheezes  She has no rales  Abdominal: Soft  She exhibits no distension  There is no tenderness  There is no rebound and no guarding     Musculoskeletal:        Right shoulder: She exhibits tenderness and pain  She exhibits no swelling, no effusion, no crepitus, no deformity, no laceration, normal pulse and normal strength  Neurological: She is alert and oriented to person, place, and time  No cranial nerve deficit or sensory deficit  Skin: Skin is warm and dry  No rash noted  She is not diaphoretic  No erythema  Psychiatric: She has a normal mood and affect  Her speech is normal and behavior is normal  Judgment and thought content normal    Nursing note and vitals reviewed  Assessment and Plan    David Thrasher is a 20 y o  female who presents with right shoulder pain  Physical examination remarkable for pain with ROM  Differential diagnosis (not completely inclusive) includes fracture/ dislocation  Plan will be to perform diagnostic testing and treat symptomatically  MDM    Diagnostic Results        Labs:    Results for orders placed or performed during the hospital encounter of 03/12/19   POCT pregnancy, urine   Result Value Ref Range    EXT PREG TEST UR (Ref: Negative) negative        All labs reviewed and utilized in the medical decision making process    Radiology:    XR shoulder 2+ views RIGHT   Final Result      Normal right shoulder              Workstation performed: [id number]             All radiology studies independently viewed by me and interpreted by the radiologist     Procedure    Procedures    [facility]      ED Course of Care and Re-Assessments        Medications   ibuprofen (MOTRIN) tablet 600 mg (600 mg Oral Given 3/13/19 2581)           FINAL IMPRESSION    Final diagnoses:   Right shoulder pain         DISPOSITION/PLAN    Time reflects when diagnosis was documented in both MDM as applicable and the Disposition within this note     Time User Action Codes Description Comment    3/13/2019 10:48 PM Bob Eugene Add [Z45 [id number]] Right shoulder pain       ED Disposition     ED Disposition Condition Date/Time Comment    Discharge Stable Wed Mar 13, 2019 10:47 PM David Loza discharge to home/self care  Follow-up Information     Follow up With Specialties Details Why Contact Info Additional Information    [facility]  [address]  [phone]       [address] Orthopedic Surgery Schedule an appointment as soon as possible for a visit   [address]  [id number]  295 [facility], 8300 [facility],  [address] [place] [address], [phone]            PATIENT REFERRED TO:    720 [facility], [address]  1000 [facility] 98 [facility] 170 [facility] [address]  [address]  [address]  [phone]  Schedule an appointment as soon as possible for a visit         DISCHARGE MEDICATIONS:    Discharge Medication List as of 3/13/2019 10:48 PM      CONTINUE these medications which have NOT CHANGED    Details   aspirin-acetaminophen-caffeine (EXCEDRIN MIGRAINE) 250-250-65 MG per tablet Take 2 tablets by mouth as needed for headaches, Historical Med      traMADol (ULTRAM) 50 mg tablet Take 1 tablet (50 mg total) by mouth every 6 (six) hours as needed for moderate pain for up to 10 days, Starting Tue 3/12/2019, Until Fri 3/22/2019, Print             No discharge procedures on file           Jovanny Lisa, [address] [place] [address],   03/18/19 7190

## 2019-03-20 ENCOUNTER — OFFICE VISIT (OUTPATIENT)
Dept: OBGYN CLINIC | Facility: CLINIC | Age: 24
End: 2019-03-20
Payer: COMMERCIAL

## 2019-03-20 VITALS
DIASTOLIC BLOOD PRESSURE: 79 MMHG | SYSTOLIC BLOOD PRESSURE: 111 MMHG | HEART RATE: 83 BPM | HEIGHT: 65 IN | BODY MASS INDEX: 44.15 KG/M2 | WEIGHT: 265 LBS

## 2019-03-20 DIAGNOSIS — M25.311 INSTABILITY OF BOTH SHOULDER JOINTS: Primary | ICD-10-CM

## 2019-03-20 DIAGNOSIS — M25.312 INSTABILITY OF BOTH SHOULDER JOINTS: Primary | ICD-10-CM

## 2019-03-20 DIAGNOSIS — F31.9 BIPOLAR DISEASE, CHRONIC (HCC): ICD-10-CM

## 2019-03-20 DIAGNOSIS — E28.2 POLYCYSTIC OVARIAN DISEASE: ICD-10-CM

## 2019-03-20 PROCEDURE — 99203 OFFICE O/P NEW LOW 30 MIN: CPT | Performed by: ORTHOPAEDIC SURGERY

## 2019-03-20 NOTE — PATIENT INSTRUCTIONS
Plan :  I reassured the patient her x-rays were negative and did not show any evidence of fracture, bone chips, or calcification  She does have loose ligaments and  it may be somewhat related to heredity or her underlying polycystic ovarian disease  I want her  to put ice in a large trash bag or bag of frozen peas on that shoulder for 15 minutes 4 times a day if needed  She can take Advil, Aleve, or Tylenol if needed for pain  I began her on a gentle home stretching only exercise program 1st to get mobility, then forward wall climbing, then scapular stabilization exercises to try to strengthen both shoulders  She is a  mainly directing  trucks for parking, 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

## 2019-03-20 NOTE — PROGRESS NOTES
Chief Complaint   Patient presents with    Right Shoulder - Pain           Assessment:  Multidirectional instability right shoulder    Plan :  I reassured the patient her x-rays were negative and did not show any evidence of fracture, bone chips, or calcification  She does have loose ligaments and  it may be somewhat related to heredity or her underlying polycystic ovarian disease  I want her  to put ice in a large trash bag or bag of frozen peas on that shoulder for 15 minutes 4 times a day if needed  She can take Advil, Aleve, or Tylenol if needed for pain  I began her on a gentle home stretching only exercise program 1st to get mobility, then forward wall climbing, then scapular stabilization exercises to try to strengthen both shoulders  She is a  mainly directing  trucks for parking, 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, 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  For that reason I will give her off till 04/01/2019 and then she can return to full unrestricted duties  It is very important that she continues her home exercises indefinitely to try to increase some stability in the shoulder  I will send her back to her family physician for routine care and see her back again if needed    HPI:   This is a 20 y o  white female presenting today for orthopedic evaluation referred by our ED regarding her right shoulder pain beginning 8 days ago  This is her dominant hand  She reports that she was at work where she is a , using her left arm and felt her right shoulder "pop out " She presenting to the ED after she came home from work  She cannot explain what happened  She reports that her right shoulder "has popped out roughly 15 times and her left shoulder about 7 times " The ED provided her with a sling that she has been compliant with wearing  She has been taking Tramadol and OTC anti-inflammatories PRN for pain control  She localizes her pain to the anterior shoulder and axilla  This is stabbing in nature with motion but is not associated with numbness and tingling  She has dull pain at rest in her sling with pain that wakes her at night       PMHx:         Past Medical History:   Diagnosis Date    ADHD (attention deficit hyperactivity disorder)     Bipolar 1 disorder (HCC)     PCOS (polycystic ovarian syndrome) 2015       Past Surgical History:   Procedure Laterality Date    NO PAST SURGERIES         Family History   Problem Relation Age of Onset    Cancer Mother         cervical    Breast cancer Neg Hx        Social History     Socioeconomic History    Marital status: Single     Spouse name: Not on file    Number of children: Not on file    Years of education: Not on file    Highest education level: Not on file   Occupational History    Not on file   Social Needs    Financial resource strain: Not on file    Food insecurity:     Worry: Not on file     Inability: Not on file    Transportation needs:     Medical: Not on file     Non-medical: Not on file   Tobacco Use    Smoking status: Former Smoker    Smokeless tobacco: Never Used   Substance and Sexual Activity    Alcohol use: Yes     Comment: 2-3x/month    Drug use: No    Sexual activity: Not on file   Lifestyle    Physical activity:     Days per week: Not on file     Minutes per session: Not on file    Stress: Not on file   Relationships    Social connections:     Talks on phone: Not on file     Gets together: Not on file     Attends Gnosticism service: Not on file     Active member of club or organization: Not on file     Attends meetings of clubs or organizations: Not on file     Relationship status: Not on file    Intimate partner violence:     Fear of current or ex partner: Not on file     Emotionally abused: Not on file     Physically abused: Not on file     Forced sexual activity: Not on file   Other Topics Concern    Not on file   Social History Narrative    Not on file       Current Outpatient Medications Medication Sig Dispense Refill    aspirin-acetaminophen-caffeine (EXCEDRIN MIGRAINE) 250-250-65 MG per tablet Take 2 tablets by mouth as needed for headaches      traMADol (ULTRAM) 50 mg tablet Take 1 tablet (50 mg total) by mouth every 6 (six) hours as needed for moderate pain for up to 10 days 12 tablet 0     No current facility-administered medications for this visit  Allergies: Patient has no known allergies  ROS:  Positive for numbness as well as orthopedic complaints as noted above  The remaining 10/12 systems on the intake sheet that I reviewed were negative  PE:  /79   Pulse 83   Ht 5' 5" (1 651 m)   Wt 120 kg (265 lb)   LMP  (LMP Unknown)   BMI 44 10 kg/m²   Constitutional: The patient was  oriented to person, place, and time  She was heavy set  In no acute distress  HEENT: Vision intact  Hearing normal  Swallowing normal   Head: Normocephalic  Cardiovascular: Intact distal pulses  Pulse regular  Pulmonary/Chest: Effort normal  No respiratory distress  Neurological: Alert and oriented to person, place, and time  Skin: Skin is warm  Psychiatric: Normal mood and affect  Ortho Exam:  On today's exam of the right shoulder, there is no obvious deformity, swelling, ecchymosis, redness or signs of infection  The skin is warm, dry and well perfused  She was exquisitely tender to palpation diffusely to the anterior shoulder  ROM of the right shoulder was severely limited due to pain and guarding when compared to the opposite left side  There was marked voluntary weakness with resisted external rotation at 0 degrees  There was marked weakness with forward elevation, horizontal adduction and internal rotation as well as forward elevation and internal rotation in the scapular plane with coaxing she can get her arm overhead with grimacing and groaning  She has minimal hyper extension of elbow and left knee but does not have hypermobility of her thumb joint    Interestingly the opposite left side shows pain with no gross laxity in inferior, anterior, or posterior stress testing  I cannot do those maneuvers on the painful right side today    She had normal sensation with brisk capillary refill to all fingers of the right hand  There was 2+ distal radial pulse  There was no antecubital adenopathy or cellulitis noted  Studies reviewed:  I personally reviewed right shoulder x-rays which were negative for fracture, dislocation, or degenerative change  There is no soft tissue calcification seen  The shoulder is located        Scribe Attestation    I,:   Oh Sierra MA am acting as a scribe while in the presence of the attending physician :        I,:   Anatoly Huerta MD personally performed the services described in this documentation    as scribed in my presence :

## 2019-03-20 NOTE — LETTER
March 20, 2019     Patient: Gio Serrano   YOB: 1995   Date of Visit: 3/20/2019       To Whom it May Concern:    Will Gonzalez is under my professional care  She was seen in my office on 3/20/2019 for her right shoulder injury  She can return to full work duties on 04/01/19  If you have any questions or concerns, please don't hesitate to call           Sincerely,          Dick Mera MD        CC: No Recipients

## 2019-03-30 ENCOUNTER — HOSPITAL ENCOUNTER (EMERGENCY)
Facility: HOSPITAL | Age: 24
Discharge: HOME | End: 2019-03-30
Attending: EMERGENCY MEDICINE | Admitting: EMERGENCY MEDICINE
Payer: COMMERCIAL

## 2019-03-30 VITALS
HEART RATE: 94 BPM | OXYGEN SATURATION: 96 % | TEMPERATURE: 98.8 F | RESPIRATION RATE: 18 BRPM | SYSTOLIC BLOOD PRESSURE: 118 MMHG | DIASTOLIC BLOOD PRESSURE: 68 MMHG | WEIGHT: 282.19 LBS | BODY MASS INDEX: 46.96 KG/M2

## 2019-03-30 DIAGNOSIS — R73.9 HYPERGLYCEMIA: ICD-10-CM

## 2019-03-30 DIAGNOSIS — R20.2 TINGLING: Primary | ICD-10-CM

## 2019-03-30 DIAGNOSIS — N39.0 UTI (URINARY TRACT INFECTION): ICD-10-CM

## 2019-03-30 LAB
ACETONE SERPL-MCNC: NEGATIVE MG/DL
ALBUMIN SERPL BCP-MCNC: 4.5 G/DL (ref 3–5.2)
ALP SERPL-CCNC: 75 U/L (ref 43–122)
ALT SERPL W P-5'-P-CCNC: 35 U/L (ref 9–52)
ANION GAP SERPL CALCULATED.3IONS-SCNC: 9 MMOL/L (ref 5–14)
AST SERPL W P-5'-P-CCNC: 55 U/L (ref 14–36)
BACTERIA UR QL AUTO: ABNORMAL /HPF
BASOPHILS # BLD AUTO: 0 THOUSANDS/ΜL (ref 0–0.1)
BASOPHILS NFR BLD AUTO: 0 % (ref 0–1)
BILIRUB SERPL-MCNC: 0.8 MG/DL
BILIRUB UR QL STRIP: NEGATIVE
BUN SERPL-MCNC: 12 MG/DL (ref 5–25)
CALCIUM SERPL-MCNC: 9.2 MG/DL (ref 8.4–10.2)
CHLORIDE SERPL-SCNC: 98 MMOL/L (ref 97–108)
CLARITY UR: ABNORMAL
CO2 SERPL-SCNC: 31 MMOL/L (ref 22–30)
COLOR UR: YELLOW
CREAT SERPL-MCNC: 0.65 MG/DL (ref 0.6–1.2)
EOSINOPHIL # BLD AUTO: 0.1 THOUSAND/ΜL (ref 0–0.4)
EOSINOPHIL NFR BLD AUTO: 1 % (ref 0–6)
ERYTHROCYTE [DISTWIDTH] IN BLOOD BY AUTOMATED COUNT: 14.3 %
EXT PREG TEST URINE: NEGATIVE
GFR SERPL CREATININE-BSD FRML MDRD: 124 ML/MIN/1.73SQ M
GLUCOSE SERPL-MCNC: 133 MG/DL (ref 65–140)
GLUCOSE SERPL-MCNC: 148 MG/DL (ref 70–99)
GLUCOSE UR STRIP-MCNC: NEGATIVE MG/DL
HCT VFR BLD AUTO: 41.6 % (ref 36–46)
HGB BLD-MCNC: 13.9 G/DL (ref 12–16)
HGB UR QL STRIP.AUTO: NEGATIVE
KETONES UR STRIP-MCNC: NEGATIVE MG/DL
LEUKOCYTE ESTERASE UR QL STRIP: 500
LYMPHOCYTES # BLD AUTO: 1.9 THOUSANDS/ΜL (ref 0.5–4)
LYMPHOCYTES NFR BLD AUTO: 22 % (ref 25–45)
MCH RBC QN AUTO: 30 PG (ref 26–34)
MCHC RBC AUTO-ENTMCNC: 33.4 G/DL (ref 31–36)
MCV RBC AUTO: 90 FL (ref 80–100)
MONOCYTES # BLD AUTO: 0.4 THOUSAND/ΜL (ref 0.2–0.9)
MONOCYTES NFR BLD AUTO: 5 % (ref 1–10)
NEUTROPHILS # BLD AUTO: 6.5 THOUSANDS/ΜL (ref 1.8–7.8)
NEUTS SEG NFR BLD AUTO: 72 % (ref 45–65)
NITRITE UR QL STRIP: NEGATIVE
NON-SQ EPI CELLS URNS QL MICRO: ABNORMAL /HPF
PH UR STRIP.AUTO: 7 [PH]
PLATELET # BLD AUTO: 172 THOUSANDS/UL (ref 150–450)
PMV BLD AUTO: 9.5 FL (ref 8.9–12.7)
POTASSIUM SERPL-SCNC: 4.1 MMOL/L (ref 3.6–5)
PROT SERPL-MCNC: 7.6 G/DL (ref 5.9–8.4)
PROT UR STRIP-MCNC: NEGATIVE MG/DL
RBC # BLD AUTO: 4.63 MILLION/UL (ref 4–5.2)
RBC #/AREA URNS AUTO: ABNORMAL /HPF
SODIUM SERPL-SCNC: 138 MMOL/L (ref 137–147)
SP GR UR STRIP.AUTO: 1.01 (ref 1–1.04)
UROBILINOGEN UA: NEGATIVE MG/DL
WBC # BLD AUTO: 8.9 THOUSAND/UL (ref 4.5–11)
WBC #/AREA URNS AUTO: ABNORMAL /HPF

## 2019-03-30 PROCEDURE — 82009 KETONE BODYS QUAL: CPT | Performed by: EMERGENCY MEDICINE

## 2019-03-30 PROCEDURE — 96360 HYDRATION IV INFUSION INIT: CPT

## 2019-03-30 PROCEDURE — 99284 EMERGENCY DEPT VISIT MOD MDM: CPT

## 2019-03-30 PROCEDURE — 85025 COMPLETE CBC W/AUTO DIFF WBC: CPT | Performed by: EMERGENCY MEDICINE

## 2019-03-30 PROCEDURE — 81001 URINALYSIS AUTO W/SCOPE: CPT | Performed by: EMERGENCY MEDICINE

## 2019-03-30 PROCEDURE — 80053 COMPREHEN METABOLIC PANEL: CPT | Performed by: EMERGENCY MEDICINE

## 2019-03-30 PROCEDURE — 96361 HYDRATE IV INFUSION ADD-ON: CPT

## 2019-03-30 PROCEDURE — 82948 REAGENT STRIP/BLOOD GLUCOSE: CPT

## 2019-03-30 PROCEDURE — 36415 COLL VENOUS BLD VENIPUNCTURE: CPT | Performed by: EMERGENCY MEDICINE

## 2019-03-30 PROCEDURE — 81025 URINE PREGNANCY TEST: CPT | Performed by: EMERGENCY MEDICINE

## 2019-03-30 RX ORDER — NITROFURANTOIN 25; 75 MG/1; MG/1
100 CAPSULE ORAL 2 TIMES DAILY
Qty: 10 CAPSULE | Refills: 0 | Status: SHIPPED | OUTPATIENT
Start: 2019-03-30 | End: 2019-08-03

## 2019-03-30 RX ADMIN — SODIUM CHLORIDE 1000 ML: 9 INJECTION, SOLUTION INTRAVENOUS at 15:24

## 2019-04-05 ENCOUNTER — TRANSCRIBE ORDERS (OUTPATIENT)
Dept: ADMINISTRATIVE | Facility: HOSPITAL | Age: 24
End: 2019-04-05

## 2019-04-05 ENCOUNTER — APPOINTMENT (OUTPATIENT)
Dept: LAB | Facility: HOSPITAL | Age: 24
End: 2019-04-05
Payer: COMMERCIAL

## 2019-04-05 ENCOUNTER — OFFICE VISIT (OUTPATIENT)
Dept: FAMILY MEDICINE CLINIC | Facility: CLINIC | Age: 24
End: 2019-04-05

## 2019-04-05 VITALS
BODY MASS INDEX: 44.32 KG/M2 | TEMPERATURE: 97.9 F | RESPIRATION RATE: 18 BRPM | OXYGEN SATURATION: 98 % | WEIGHT: 266 LBS | DIASTOLIC BLOOD PRESSURE: 72 MMHG | HEIGHT: 65 IN | HEART RATE: 89 BPM | SYSTOLIC BLOOD PRESSURE: 118 MMHG

## 2019-04-05 DIAGNOSIS — Z87.898 HISTORY OF PREDIABETES: ICD-10-CM

## 2019-04-05 DIAGNOSIS — E28.2 PCOS (POLYCYSTIC OVARIAN SYNDROME): ICD-10-CM

## 2019-04-05 DIAGNOSIS — Z87.898 HISTORY OF PREDIABETES: Primary | ICD-10-CM

## 2019-04-05 LAB
ALBUMIN SERPL BCP-MCNC: 4.4 G/DL (ref 3–5.2)
ALP SERPL-CCNC: 80 U/L (ref 43–122)
ALT SERPL W P-5'-P-CCNC: 54 U/L (ref 9–52)
ANION GAP SERPL CALCULATED.3IONS-SCNC: 11 MMOL/L (ref 5–14)
AST SERPL W P-5'-P-CCNC: 49 U/L (ref 14–36)
BILIRUB SERPL-MCNC: 0.7 MG/DL
BUN SERPL-MCNC: 12 MG/DL (ref 5–25)
CALCIUM SERPL-MCNC: 9.4 MG/DL (ref 8.4–10.2)
CHLORIDE SERPL-SCNC: 101 MMOL/L (ref 97–108)
CO2 SERPL-SCNC: 25 MMOL/L (ref 22–30)
CREAT SERPL-MCNC: 0.53 MG/DL (ref 0.6–1.2)
EST. AVERAGE GLUCOSE BLD GHB EST-MCNC: 128 MG/DL
GFR SERPL CREATININE-BSD FRML MDRD: 132 ML/MIN/1.73SQ M
GLUCOSE P FAST SERPL-MCNC: 98 MG/DL (ref 70–99)
HBA1C MFR BLD: 6.1 % (ref 4.2–6.3)
POTASSIUM SERPL-SCNC: 4.4 MMOL/L (ref 3.6–5)
PROT SERPL-MCNC: 7.4 G/DL (ref 5.9–8.4)
SODIUM SERPL-SCNC: 137 MMOL/L (ref 137–147)
TSH SERPL DL<=0.05 MIU/L-ACNC: 2.95 UIU/ML (ref 0.47–4.68)

## 2019-04-05 PROCEDURE — 83036 HEMOGLOBIN GLYCOSYLATED A1C: CPT

## 2019-04-05 PROCEDURE — 84443 ASSAY THYROID STIM HORMONE: CPT

## 2019-04-05 PROCEDURE — 3008F BODY MASS INDEX DOCD: CPT | Performed by: PHYSICIAN ASSISTANT

## 2019-04-05 PROCEDURE — 99213 OFFICE O/P EST LOW 20 MIN: CPT | Performed by: PHYSICIAN ASSISTANT

## 2019-04-05 PROCEDURE — 36415 COLL VENOUS BLD VENIPUNCTURE: CPT

## 2019-04-05 PROCEDURE — 80053 COMPREHEN METABOLIC PANEL: CPT

## 2019-04-10 ENCOUNTER — TELEPHONE (OUTPATIENT)
Dept: FAMILY MEDICINE CLINIC | Facility: CLINIC | Age: 24
End: 2019-04-10

## 2019-04-10 DIAGNOSIS — R73.03 PREDIABETES: Primary | ICD-10-CM

## 2019-04-10 RX ORDER — METFORMIN HYDROCHLORIDE EXTENDED-RELEASE TABLETS 500 MG/1
500 TABLET, FILM COATED, EXTENDED RELEASE ORAL
Qty: 90 TABLET | Refills: 1 | Status: SHIPPED | OUTPATIENT
Start: 2019-04-10 | End: 2019-04-15 | Stop reason: ALTCHOICE

## 2019-04-15 ENCOUNTER — TELEPHONE (OUTPATIENT)
Dept: FAMILY MEDICINE CLINIC | Facility: CLINIC | Age: 24
End: 2019-04-15

## 2019-04-15 DIAGNOSIS — R73.03 PREDIABETES: Primary | ICD-10-CM

## 2019-04-15 DIAGNOSIS — Z87.898 HISTORY OF PREDIABETES: ICD-10-CM

## 2019-08-03 ENCOUNTER — HOSPITAL ENCOUNTER (EMERGENCY)
Facility: HOSPITAL | Age: 24
Discharge: HOME | End: 2019-08-03
Attending: EMERGENCY MEDICINE | Admitting: EMERGENCY MEDICINE
Payer: COMMERCIAL

## 2019-08-03 VITALS
BODY MASS INDEX: 42.58 KG/M2 | TEMPERATURE: 98.3 F | DIASTOLIC BLOOD PRESSURE: 83 MMHG | RESPIRATION RATE: 16 BRPM | HEART RATE: 78 BPM | SYSTOLIC BLOOD PRESSURE: 135 MMHG | OXYGEN SATURATION: 100 % | WEIGHT: 255.9 LBS

## 2019-08-03 DIAGNOSIS — R51.9 HEADACHE: Primary | ICD-10-CM

## 2019-08-03 PROCEDURE — 99283 EMERGENCY DEPT VISIT LOW MDM: CPT

## 2019-08-03 PROCEDURE — 99283 EMERGENCY DEPT VISIT LOW MDM: CPT | Performed by: EMERGENCY MEDICINE

## 2019-08-03 PROCEDURE — 96372 THER/PROPH/DIAG INJ SC/IM: CPT

## 2019-08-03 RX ORDER — KETOROLAC TROMETHAMINE 30 MG/ML
15 INJECTION, SOLUTION INTRAMUSCULAR; INTRAVENOUS ONCE
Status: COMPLETED | OUTPATIENT
Start: 2019-08-03 | End: 2019-08-03

## 2019-08-03 RX ADMIN — KETOROLAC TROMETHAMINE 15 MG: 30 INJECTION, SOLUTION INTRAMUSCULAR at 15:52

## 2019-08-03 NOTE — ED PROVIDER NOTES
History  Chief Complaint   Patient presents with    Headache     headache since 0800 - took 2 tylenol  for same - has never seen a neurologist for the headaches      25-year-old female with a history of headaches presents for evaluation of right-sided frontal headache that started at 8:00 a m  This morning  Patient reports taking 1 tablet of Tylenol when the headache started but only mildly improved symptoms  She states she is here because she had to leave work and needs a note to return to work tomorrow  Denies associated changes in vision, fevers, neck pain, vomiting  Headache is similar to prior and last 1 was approximately 1 month ago  Denies chest pain, shortness of breath, abdominal pain  Prior to Admission Medications   Prescriptions Last Dose Informant Patient Reported? Taking?   aspirin-acetaminophen-caffeine (EXCEDRIN MIGRAINE) 250-250-65 MG per tablet   Yes No   Sig: Take 2 tablets by mouth as needed for headaches   metFORMIN (GLUCOPHAGE) 500 mg tablet   No No   Sig: Take 1 tablet (500 mg total) by mouth daily with breakfast   nitrofurantoin (MACROBID) 100 mg capsule   No No   Sig: Take 1 capsule (100 mg total) by mouth 2 (two) times a day      Facility-Administered Medications: None       Past Medical History:   Diagnosis Date    ADHD (attention deficit hyperactivity disorder)     Bipolar 1 disorder (HCC)     PCOS (polycystic ovarian syndrome) 2015       Past Surgical History:   Procedure Laterality Date    NO PAST SURGERIES         Family History   Problem Relation Age of Onset    Cancer Mother         cervical    Breast cancer Neg Hx      I have reviewed and agree with the history as documented  Social History     Tobacco Use    Smoking status: Former Smoker    Smokeless tobacco: Never Used   Substance Use Topics    Alcohol use: Yes     Comment: 1 X month    Drug use: No        Review of Systems   Constitutional: Negative for chills, diaphoresis and fever     HENT: Negative for congestion and rhinorrhea  Eyes: Negative for pain and visual disturbance  Respiratory: Negative for cough, shortness of breath and wheezing  Cardiovascular: Negative for chest pain and leg swelling  Gastrointestinal: Negative for abdominal pain, diarrhea, nausea and vomiting  Genitourinary: Negative for difficulty urinating, dysuria, frequency and urgency  Musculoskeletal: Negative for back pain and neck pain  Skin: Negative for color change and rash  Neurological: Positive for headaches  Negative for syncope and numbness  All other systems reviewed and are negative  Physical Exam  Physical Exam   Constitutional: She is oriented to person, place, and time  She appears well-developed and well-nourished  HENT:   Head: Normocephalic and atraumatic  Eyes: Pupils are equal, round, and reactive to light  Conjunctivae and EOM are normal    Pupils 4 mm equal and reactive  Visual fields intact in all 4 quadrants  Neck: Normal range of motion  Neck supple  Cardiovascular: Normal rate and regular rhythm  Pulmonary/Chest: Effort normal and breath sounds normal  No respiratory distress  She has no wheezes  She has no rales  Abdominal: Soft  Bowel sounds are normal  There is no tenderness  There is no guarding  Musculoskeletal: Normal range of motion  She exhibits no edema or tenderness  5/5 strength of bilateral upper and lower extremities  Neurological: She is alert and oriented to person, place, and time  No cranial nerve deficit  Skin: Skin is warm  No erythema  Psychiatric: She has a normal mood and affect  Her behavior is normal    Nursing note and vitals reviewed        Vital Signs  ED Triage Vitals   Temperature Pulse Respirations Blood Pressure SpO2   08/03/19 1537 08/03/19 1537 08/03/19 1537 08/03/19 1540 08/03/19 1537   98 3 °F (36 8 °C) 78 16 135/83 100 %      Temp Source Heart Rate Source Patient Position - Orthostatic VS BP Location FiO2 (%)   08/03/19 1537 08/03/19 1537 08/03/19 1540 08/03/19 1540 --   Tympanic Monitor Sitting Left arm       Pain Score       --                  Vitals:    08/03/19 1537 08/03/19 1540   BP:  135/83   Pulse: 78    Patient Position - Orthostatic VS:  Sitting         Visual Acuity      ED Medications  Medications   ketorolac (TORADOL) injection 15 mg (has no administration in time range)       Diagnostic Studies  Results Reviewed     None                 No orders to display              Procedures  Procedures       ED Course                               MDM  Number of Diagnoses or Management Options  Headache:   Diagnosis management comments: 25-year-old female presenting with acute on chronic headache started this morning  Offered IV fluids, medications the patient states she would like to rest at home  Will administer IM shot of Toradol and discharge  Return precautions provided  Disposition  Final diagnoses:   Headache     Time reflects when diagnosis was documented in both MDM as applicable and the Disposition within this note     Time User Action Codes Description Comment    8/3/2019  3:46 PM Leeroy Mathew Add [R51] Headache       ED Disposition     ED Disposition Condition Date/Time Comment    Discharge Stable Sat Aug 3, 2019  3:46 PM Ruth Mediate discharge to home/self care  Follow-up Information     Follow up With Specialties Details Why Contact Info    Rehab DO [facility] In 2 days For further evaluation [address] Emergency Department Emergency Medicine  If symptoms worsen [address] [address] [phone]          Patient's Medications   Discharge Prescriptions    No medications on file     No discharge procedures on file      ED Provider  Electronically Signed by           Jeanna Burns DO  08/03/19 1549

## 2019-08-17 ENCOUNTER — HOSPITAL ENCOUNTER (EMERGENCY)
Facility: HOSPITAL | Age: 24
Discharge: HOME | End: 2019-08-17
Attending: EMERGENCY MEDICINE | Admitting: EMERGENCY MEDICINE
Payer: COMMERCIAL

## 2019-08-17 VITALS
RESPIRATION RATE: 16 BRPM | BODY MASS INDEX: 42.43 KG/M2 | DIASTOLIC BLOOD PRESSURE: 88 MMHG | HEART RATE: 80 BPM | WEIGHT: 255 LBS | TEMPERATURE: 97.6 F | OXYGEN SATURATION: 98 % | SYSTOLIC BLOOD PRESSURE: 149 MMHG

## 2019-08-17 DIAGNOSIS — R51.9 HEADACHE: Primary | ICD-10-CM

## 2019-08-17 LAB
EXT PREG TEST URINE: NEGATIVE
EXT. CONTROL ED NAV: NORMAL

## 2019-08-17 PROCEDURE — 96372 THER/PROPH/DIAG INJ SC/IM: CPT

## 2019-08-17 PROCEDURE — 81025 URINE PREGNANCY TEST: CPT | Performed by: PHYSICIAN ASSISTANT

## 2019-08-17 PROCEDURE — 99284 EMERGENCY DEPT VISIT MOD MDM: CPT | Performed by: PHYSICIAN ASSISTANT

## 2019-08-17 PROCEDURE — 99283 EMERGENCY DEPT VISIT LOW MDM: CPT

## 2019-08-17 RX ORDER — ONDANSETRON 4 MG/1
4 TABLET, ORALLY DISINTEGRATING ORAL ONCE
Status: COMPLETED | OUTPATIENT
Start: 2019-08-17 | End: 2019-08-17

## 2019-08-17 RX ORDER — KETOROLAC TROMETHAMINE 30 MG/ML
15 INJECTION, SOLUTION INTRAMUSCULAR; INTRAVENOUS ONCE
Status: COMPLETED | OUTPATIENT
Start: 2019-08-17 | End: 2019-08-17

## 2019-08-17 RX ADMIN — KETOROLAC TROMETHAMINE 15 MG: 30 INJECTION, SOLUTION INTRAMUSCULAR; INTRAVENOUS at 11:55

## 2019-08-17 RX ADMIN — ONDANSETRON 4 MG: 4 TABLET, ORALLY DISINTEGRATING ORAL at 11:54

## 2019-08-17 NOTE — ED PROVIDER NOTES
History  Chief Complaint   Patient presents with    Migraine     Hx of migraine began last night into this am, didn't take meds  3-4 episodes of vomiting last night  Has been looking for a neurologist, but they dont take her insurance  Didn't take any medicine for her migraine  78-year-old female past medical history PCOS, migraines, bipolar disorder presenting for evaluation headache  Patient reports she had gradual onset headache since last night  She reports pain across her forehead which is consistent with previous migraines in the past   She reports nausea and 2 episodes of vomiting last night but no episodes emesis today  She is specifically requesting IM Toradol and Zofran  She does not want any IV or IV fluids at this time  She denies any dizziness blurry vision loss of vision diplopia  No numbness tingling or weakness in the arms or legs  Denies any direct trauma or injury to the head  No neck pain or fevers  Prior to Admission Medications   Prescriptions Last Dose Informant Patient Reported? Taking?   aspirin-acetaminophen-caffeine (Sofi Mura) 250-250-65 MG per tablet   Yes No   Sig: Take 2 tablets by mouth as needed for headaches   metFORMIN (GLUCOPHAGE) 500 mg tablet   No No   Sig: Take 1 tablet (500 mg total) by mouth daily with breakfast      Facility-Administered Medications: None       Past Medical History:   Diagnosis Date    ADHD (attention deficit hyperactivity disorder)     Bipolar 1 disorder (HCC)     PCOS (polycystic ovarian syndrome) 2015       Past Surgical History:   Procedure Laterality Date    NO PAST SURGERIES         Family History   Problem Relation Age of Onset    Cancer Mother         cervical    Breast cancer Neg Hx      I have reviewed and agree with the history as documented      Social History     Tobacco Use    Smoking status: Former Smoker    Smokeless tobacco: Never Used   Substance Use Topics    Alcohol use: Yes     Comment: 1 X month    Drug use: No        Review of Systems   All other systems reviewed and are negative  Physical Exam  Physical Exam   Constitutional: She is oriented to person, place, and time  She appears well-nourished  No distress  Visualized pt ambulating from triage to exam room, overweight, friendly   HENT:   Head: Normocephalic and atraumatic  Eyes: Conjunctivae are normal    EOM grossly intact   Neck: Normal range of motion  Neck supple  No JVD present  Cardiovascular: Normal rate  Pulmonary/Chest: Effort normal    Abdominal: Soft  Musculoskeletal:   FROM, steady gait, cap refill brisk, strength and sensation grossly intact throughout   Neurological: She is alert and oriented to person, place, and time  She has normal strength  No cranial nerve deficit or sensory deficit  She exhibits normal muscle tone  Coordination and gait normal  GCS eye subscore is 4  GCS verbal subscore is 5  GCS motor subscore is 6  Skin: Skin is warm and dry  Capillary refill takes less than 2 seconds  She is not diaphoretic  Psychiatric: She has a normal mood and affect  Her behavior is normal    Nursing note and vitals reviewed        Vital Signs  ED Triage Vitals [08/17/19 1134]   Temperature Pulse Respirations Blood Pressure SpO2   97 6 °F (36 4 °C) 80 16 149/88 98 %      Temp Source Heart Rate Source Patient Position - Orthostatic VS BP Location FiO2 (%)   Tympanic -- Sitting Left arm --      Pain Score       --           Vitals:    08/17/19 1134   BP: 149/88   Pulse: 80   Patient Position - Orthostatic VS: Sitting         Visual Acuity      ED Medications  Medications   ketorolac (TORADOL) injection 15 mg (15 mg Intramuscular Given 8/17/19 1155)   ondansetron (ZOFRAN-ODT) dispersible tablet 4 mg (4 mg Oral Given 8/17/19 1154)       Diagnostic Studies  Results Reviewed     Procedure Component Value Units Date/Time    POCT pregnancy, urine [[id number]]  (Normal) Resulted:  08/17/19 1157    Lab Status:  Final result Updated: 08/17/19 1157     EXT PREG TEST UR (Ref: Negative) negative     Control valid                 No orders to display              Procedures  Procedures       ED Course  ED Course as of Aug 17 1356   Sat Aug 17, 2019   1218 Pt reports improvement of HA, is asking to go home at this time, will d/c pt home                                    MDM  Number of Diagnoses or Management Options  Headache:   Diagnosis management comments: 78-year-old female presenting for evaluation of gradual onset headache since yesterday similar to previous migraines, patient refusing any IV, she is requesting IM Toradol and ODT Zofran, after medication administration patient reports improvement in symptoms and is asking to go home sleep it  No focal neurological deficits  No trauma to the head  Will provide info to f/u with neurology as an outpatient    strict return to ED precautions discussed  Pt verbalizes understanding and agrees with plan  Pt is stable for discharge    Portions of the record may have been created with voice recognition software  Occasional wrong word or "sound a like" substitutions may have occurred due to the inherent limitations of voice recognition software  Read the chart carefully and recognize, using context, where substitutions have occurred  Disposition  Final diagnoses:   Headache     Time reflects when diagnosis was documented in both MDM as applicable and the Disposition within this note     Time User Action Codes Description Comment    8/17/2019 12:19 PM Shama Armstrong Add [R51] Headache       ED Disposition     ED Disposition Condition Date/Time Comment    Discharge Stable Sat Aug 17, 2019 12:19 PM [facility] discharge to home/self care              Follow-up Information     Follow up With Specialties Details Why Contact Info    Rehab Gabi Membreno DO Family Medicine Call in 1 day  [address] [address]       Deysi Kenney MD Neurology Call in 1 day [address] [facility]  [phone]            Discharge Medication List as of 8/17/2019 12:19 PM      CONTINUE these medications which have NOT CHANGED    Details   aspirin-acetaminophen-caffeine (EXCEDRIN MIGRAINE) 250-250-65 MG per tablet Take 2 tablets by mouth as needed for headaches, Historical Med      metFORMIN (GLUCOPHAGE) 500 mg tablet Take 1 tablet (500 mg total) by mouth daily with breakfast, Starting Mon 4/15/2019, Normal           No discharge procedures on file      ED Provider  Electronically Signed by           Kat Barrientos PA-C  08/17/19 4309

## 2019-09-23 ENCOUNTER — HOSPITAL ENCOUNTER (EMERGENCY)
Facility: HOSPITAL | Age: 24
Discharge: HOME | End: 2019-09-23
Attending: EMERGENCY MEDICINE
Payer: COMMERCIAL

## 2019-09-23 VITALS
WEIGHT: 248.02 LBS | DIASTOLIC BLOOD PRESSURE: 75 MMHG | RESPIRATION RATE: 18 BRPM | OXYGEN SATURATION: 99 % | TEMPERATURE: 98.2 F | SYSTOLIC BLOOD PRESSURE: 140 MMHG | HEART RATE: 95 BPM | BODY MASS INDEX: 41.27 KG/M2

## 2019-09-23 DIAGNOSIS — J06.9 VIRAL URI WITH COUGH: Primary | ICD-10-CM

## 2019-09-23 DIAGNOSIS — J04.0 LARYNGITIS: ICD-10-CM

## 2019-09-23 PROCEDURE — 99283 EMERGENCY DEPT VISIT LOW MDM: CPT

## 2019-09-23 PROCEDURE — 99282 EMERGENCY DEPT VISIT SF MDM: CPT | Performed by: PHYSICIAN ASSISTANT

## 2019-09-23 RX ORDER — GUAIFENESIN 100 MG/5ML
200 SYRUP ORAL 3 TIMES DAILY PRN
Qty: 120 ML | Refills: 0 | Status: SHIPPED | OUTPATIENT
Start: 2019-09-23 | End: 2019-09-23 | Stop reason: SDUPTHER

## 2019-09-23 RX ORDER — ALBUTEROL SULFATE 90 UG/1
2 AEROSOL, METERED RESPIRATORY (INHALATION) EVERY 4 HOURS PRN
Qty: 1 INHALER | Refills: 0 | Status: SHIPPED | OUTPATIENT
Start: 2019-09-23 | End: 2019-09-23 | Stop reason: SDUPTHER

## 2019-09-23 RX ORDER — GUAIFENESIN 100 MG/5ML
200 SYRUP ORAL 3 TIMES DAILY PRN
Qty: 120 ML | Refills: 0 | Status: SHIPPED | OUTPATIENT
Start: 2019-09-23 | End: 2019-10-03

## 2019-09-23 RX ORDER — ALBUTEROL SULFATE 90 UG/1
2 AEROSOL, METERED RESPIRATORY (INHALATION) EVERY 4 HOURS PRN
Qty: 1 INHALER | Refills: 0 | Status: SHIPPED | OUTPATIENT
Start: 2019-09-23 | End: 2020-03-05 | Stop reason: SDUPTHER

## 2019-09-23 NOTE — ED PROVIDER NOTES
History  Chief Complaint   Patient presents with    Cough     cough, nausea, congestion, losing voice for past 2 days     This is a 49-year-old female with past medical history of PCOS, bipolar disorder, and ADHD who presents today with a cough, congestion, and raspy voice for the past 2 days  The patient reports that she awoke this morning and was unable to speak  She reports a cough that has been present for 2 days  She states the cough is mildly productive  No fever  No vomiting  No diarrhea  No abdominal pain  She reports there are sick contacts at home  She reports that she has not taken anything for her symptoms  She is eating and drinking normally  Prior to Admission Medications   Prescriptions Last Dose Informant Patient Reported? Taking?   aspirin-acetaminophen-caffeine (Jonnie [place]) 250-250-65 MG per tablet   Yes No   Sig: Take 2 tablets by mouth as needed for headaches   metFORMIN (GLUCOPHAGE) 500 mg tablet   No No   Sig: Take 1 tablet (500 mg total) by mouth daily with breakfast      Facility-Administered Medications: None       Past Medical History:   Diagnosis Date    ADHD (attention deficit hyperactivity disorder)     Bipolar 1 disorder (HCC)     PCOS (polycystic ovarian syndrome) 2015       Past Surgical History:   Procedure Laterality Date    NO PAST SURGERIES         Family History   Problem Relation Age of Onset    Cancer Mother         cervical    Breast cancer Neg Hx      I have reviewed and agree with the history as documented  Social History     Tobacco Use    Smoking status: Former Smoker    Smokeless tobacco: Never Used   Substance Use Topics    Alcohol use: Yes     Comment: 1 X month    Drug use: No        Review of Systems   Constitutional: Negative  HENT: Positive for congestion, rhinorrhea and sore throat  Eyes: Negative  Respiratory: Positive for cough  Cardiovascular: Negative  Gastrointestinal: Negative  Endocrine: Negative  Genitourinary: Negative  Musculoskeletal: Negative  Skin: Negative  Allergic/Immunologic: Negative  Neurological: Negative  Hematological: Negative  Psychiatric/Behavioral: Negative  Physical Exam  Physical Exam   Constitutional: She appears well-developed and well-nourished  HENT:   Head: Normocephalic and atraumatic  Right Ear: Hearing, tympanic membrane, external ear and ear canal normal    Left Ear: Hearing, tympanic membrane, external ear and ear canal normal    Nose: Rhinorrhea present  Mouth/Throat: Uvula is midline, oropharynx is clear and moist and mucous membranes are normal  No oropharyngeal exudate, posterior oropharyngeal edema, posterior oropharyngeal erythema or tonsillar abscesses  No tonsillar exudate  Cardiovascular: Normal rate, regular rhythm and normal heart sounds  Pulmonary/Chest: Effort normal and breath sounds normal        Vital Signs  ED Triage Vitals [09/23/19 1257]   Temperature Pulse Respirations Blood Pressure SpO2   98 2 °F (36 8 °C) 95 18 140/75 99 %      Temp Source Heart Rate Source Patient Position - Orthostatic VS BP Location FiO2 (%)   Tympanic Monitor Sitting Left arm --      Pain Score       No Pain           Vitals:    09/23/19 1257   BP: 140/75   Pulse: 95   Patient Position - Orthostatic VS: Sitting         Visual Acuity      ED Medications  Medications - No data to display    Diagnostic Studies  Results Reviewed     None                 No orders to display              Procedures  Procedures       ED Course                               MDM  Number of Diagnoses or Management Options  Laryngitis:   Viral URI with cough:   Diagnosis management comments: This is a 49-year-old female who presents today with laryngitis and viral upper respiratory infection  Patient was discharged home with the Robitussin and albuterol to use as needed  Patient was discharged home stable    I reviewed strict indications on it and when to return to the emergency department  Disposition  Final diagnoses:   Viral URI with cough   Laryngitis     Time reflects when diagnosis was documented in both MDM as applicable and the Disposition within this note     Time User Action Codes Description Comment    9/23/2019  1:19 PM Maggie Addison Add [J06 9,  B97 89] Viral URI with cough     9/23/2019  1:19 PM Becky Hurley TYRONE Add [J04 0] Laryngitis       ED Disposition     ED Disposition Condition Date/Time Comment    Discharge Stable Mon Sep 23, 2019  1:19 PM Ericka Jackman discharge to home/self care  Follow-up Information    None         Discharge Medication List as of 9/23/2019  1:20 PM      START taking these medications    Details   albuterol (PROVENTIL HFA,VENTOLIN HFA) 90 mcg/act inhaler Inhale 2 puffs every 4 (four) hours as needed for wheezing, Starting Mon 9/23/2019, Normal      guaiFENesin (ROBITUSSIN) 100 mg/5 mL syrup Take 10 mL (200 mg total) by mouth 3 (three) times a day as needed for cough for up to 10 days, Starting Mon 9/23/2019, Until Thu 10/3/2019, Normal         CONTINUE these medications which have NOT CHANGED    Details   aspirin-acetaminophen-caffeine (EXCEDRIN MIGRAINE) 250-250-65 MG per tablet Take 2 tablets by mouth as needed for headaches, Historical Med      metFORMIN (GLUCOPHAGE) 500 mg tablet Take 1 tablet (500 mg total) by mouth daily with breakfast, Starting Mon 4/15/2019, Normal           No discharge procedures on file      ED Provider  Electronically Signed by           Mayco Ortiz PA-C  09/23/19 0853

## 2019-09-25 ENCOUNTER — OFFICE VISIT (OUTPATIENT)
Dept: FAMILY MEDICINE CLINIC | Facility: CLINIC | Age: 24
End: 2019-09-25

## 2019-09-25 VITALS
SYSTOLIC BLOOD PRESSURE: 100 MMHG | BODY MASS INDEX: 41.6 KG/M2 | HEART RATE: 88 BPM | DIASTOLIC BLOOD PRESSURE: 82 MMHG | TEMPERATURE: 97.9 F | WEIGHT: 250 LBS | OXYGEN SATURATION: 98 % | RESPIRATION RATE: 16 BRPM

## 2019-09-25 DIAGNOSIS — F31.9 BIPOLAR DEPRESSION (HCC): ICD-10-CM

## 2019-09-25 DIAGNOSIS — R73.03 PREDIABETES: ICD-10-CM

## 2019-09-25 DIAGNOSIS — J04.0 LARYNGITIS: Primary | ICD-10-CM

## 2019-09-25 DIAGNOSIS — F90.2 ATTENTION DEFICIT HYPERACTIVITY DISORDER (ADHD), COMBINED TYPE: ICD-10-CM

## 2019-09-25 DIAGNOSIS — F84.5 ASPERGER SYNDROME: ICD-10-CM

## 2019-09-25 LAB — SL AMB POCT HEMOGLOBIN AIC: 5.5 (ref ?–6.5)

## 2019-09-25 PROCEDURE — 99214 OFFICE O/P EST MOD 30 MIN: CPT | Performed by: PHYSICIAN ASSISTANT

## 2019-09-25 PROCEDURE — 83036 HEMOGLOBIN GLYCOSYLATED A1C: CPT | Performed by: PHYSICIAN ASSISTANT

## 2019-09-25 PROCEDURE — 3044F HG A1C LEVEL LT 7.0%: CPT | Performed by: PHYSICIAN ASSISTANT

## 2019-09-25 RX ORDER — BENZONATATE 100 MG/1
100 CAPSULE ORAL 3 TIMES DAILY PRN
Qty: 30 CAPSULE | Refills: 0 | Status: SHIPPED | OUTPATIENT
Start: 2019-09-25 | End: 2019-12-19 | Stop reason: ALTCHOICE

## 2019-09-25 NOTE — PROGRESS NOTES
Assessment/Plan:    Prediabetes  - POCT HgbA1c today in office is 5 5  Discussed with patient there is no indication for patient to be on medication for prediabetes at this point  Continue to follow diabetic diet with focus on low intake of carbohydrates and sugars  Will continue to monitor  Bipolar Disorder/ADHD/Asperger Syndrome  - Will refer to behavior Health therapist to help patient in finding mental health services  Advised patient to go to the ER if she starts to experience any suicidal or homicidal ideations  Laryngitis  - Advised to continue taking Robitussin as prescribed  Will prescribe Tessalon Perles, to be taken 3 times daily as needed for the cough  Advised to rest and to stay well hydrated throughout the day  Advised to return to clinic if symptoms persist, worsen, or new symptoms arise  Diagnoses and all orders for this visit:    Laryngitis  -     benzonatate (TESSALON PERLES) 100 mg capsule; Take 1 capsule (100 mg total) by mouth 3 (three) times a day as needed for cough    Prediabetes  -     POCT hemoglobin A1c    Attention deficit hyperactivity disorder (ADHD), combined type  -     Ambulatory referral to behavioral health therapists; Future    [facility]  -     Ambulatory referral to behavioral health therapists; Future    Asperger syndrome  -     Ambulatory referral to behavioral health therapists; Future        All of patients questions were answered  Patient understands and agrees with the above plan  Return in about 4 weeks (around 10/23/2019) for Next scheduled follow up  David Talavera PA-C  09/25/19  TAI Jung          Subjective:     Patient ID: Elissa Yu  is a 20 y o  female with known PMH of PCOS, asthma, prediabetes, ADHD, bipolar disorder who presents today in office for prediabetes follow up and ER visit follow up  - Patient is a 20 y o  female who presents today for prediabetes follow up and ER visit follow up      Prediabetes: Currently prescribed metformin 500 mg once daily  However, patient notes the medication causes her to have nausea, upset stomach, and vomiting  She notes he works as a  and every time she vomits, she has to go home  She notes that was happening way too often and she was missing a lot of work  Therefore, she has now stopped the metformin and the symptoms have gone away  Patient notes she is now drinking a lot more water than she used to  Patient notes when she does drink soda or ice tea, she always tries to choose the diet type  - Patient presented to [facility] on 09/23/2019 due to cough, congestion, and losing her voice for 2 days  Patient was diagnosed with viral URI and laryngitis and was prescribed Robitussin  Today, patient notes the Robitussin has not been helping that much  Patient notes she persists with the cough and congestion  Patient denies any fevers, chills, nausea, vomiting, diarrhea, constipation, abdominal pain  Patient notes she has been feeling some chest tightness so she has been using her albuterol little more often than usual     - Patient notes for the past few months she has been very depressed and anxious  Patient notes she was diagnosed with ADHD, Asperger's, and bipolar disorder  Patient notes she was going to therapy and was on medication for these in [id number]  Patient notes she was taking Seroquel, Strattera, and Tegretol  She notes she then stopped them because they were making her feel like she was a zombie  Patient has not seen a psychiatrist since then  Patient notes she is now at her breaking point  Patient notes she has been experiencing a lot of bad luck lately including 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, and then starting another job but then being fired from that one as well due to saying a curse word  Patient notes now she is holding signs on the weekend in order to receive some money    Patient notes she was previously on disability due to her mood disorders, but now she is not  Patient notes her insurance is up after this month, so she will be reapplying for that as well  Patient denies any suicidal or homicidal ideations  Patient notes she has no one to talk to  She notes she does not like to talk to her mom because her mom suffers from anxiety as well and she does not want to worry her  Patient notes she is also in the process of moving from one house to another  She notes she is now moving in with her sister who has to little children, ages 7 months and 3point 11years old  Patient notes those babies are the ones that keep her happy and keep her laughing  Patient is interested in starting therapy again  The following portions of the patient's history were reviewed and updated as appropriate: allergies, current medications, past family history, past medical history, past social history, past surgical history and problem list         Review of Systems   Constitutional: Negative for appetite change, chills, fatigue and fever  HENT: Positive for congestion, rhinorrhea and voice change  Negative for ear pain and sore throat  Eyes: Negative for pain  Respiratory: Positive for cough and chest tightness  Negative for shortness of breath  Cardiovascular: Negative for chest pain and palpitations  Gastrointestinal: Negative for abdominal pain, constipation, diarrhea, nausea and vomiting  Genitourinary: Negative for difficulty urinating and dysuria  Musculoskeletal: Negative for arthralgias  Skin: Negative for rash  Neurological: Negative for dizziness  Psychiatric/Behavioral: Negative for behavioral problems, self-injury and suicidal ideas  The patient is nervous/anxious               Objective:   Vitals:    09/25/19 0751   BP: 100/82   BP Location: Left arm   Patient Position: Sitting   Cuff Size: Adult   Pulse: 88   Resp: 16   Temp: 97 9 °F (36 6 °C)   TempSrc: Temporal   SpO2: 98% Weight: 113 kg (250 lb)         Physical Exam   Constitutional: She is oriented to person, place, and time  She appears well-developed and well-nourished  No distress  HENT:   Head: Normocephalic and atraumatic  Right Ear: Tympanic membrane, external ear and ear canal normal    Left Ear: Tympanic membrane, external ear and ear canal normal    Nose: Mucosal edema and rhinorrhea present  Mouth/Throat: Uvula is midline, oropharynx is clear and moist and mucous membranes are normal    Eyes: Pupils are equal, round, and reactive to light  Conjunctivae and EOM are normal    Neck: Normal range of motion  Neck supple  Cardiovascular: Normal rate, regular rhythm and normal heart sounds  No murmur heard  Pulmonary/Chest: Effort normal and breath sounds normal  She has no wheezes  Musculoskeletal: Normal range of motion  Neurological: She is alert and oriented to person, place, and time  Skin: Skin is warm and dry  Psychiatric: She has a normal mood and affect  Her speech is normal and behavior is normal  She expresses no homicidal and no suicidal ideation  She expresses no suicidal plans and no homicidal plans  Nursing note and vitals reviewed

## 2019-09-25 NOTE — PATIENT INSTRUCTIONS
Laryngitis   WHAT YOU NEED TO KNOW:   What is laryngitis? Laryngitis is a when your larynx is swollen  The larynx is the muscular tube in your neck that contains the vocal cords  The larynx also prevents food and liquids from going into your lungs  The vocal cords in your larynx usually move easily together and apart  When you have laryngitis, your vocal cords swell and change shape  This may change how your voice sounds  What causes laryngitis? Any of the following may cause laryngitis:  · Gastric reflux: This is a condition where foods and acids from your stomach flow back into your esophagus  The acid from your stomach may reach your larynx and damage it  Ask your healthcare provider for more information about gastric reflux  · Infections: The most common cause of laryngitis is a viral infection  Infections from bacteria or fungi may also cause laryngitis  · Irritation: Things in the air that you breath in may irritate your larynx, such as chemicals and pollen  · Other conditions: These include vocal cord paralysis and tumors in the larynx  What increases my risk of having laryngitis? Any of the following may increase your risk of having laryngitis:  · Conditions that weaken your immune system: Your immune system is your body's defense against certain infections  The system does not work as well when you have a long-term medical condition, such as diabetes or AIDS  · Exposure to irritating or harmful substances:     ¨ Drinking alcohol: Your risk increases if you drink alcohol frequently or in large amounts  ¨ Smoking: Smoking or being around cigarette smokers and inhaling the smoke can irritate or damage your larynx  ¨ Substances in the air: Working or being around certain chemicals or substances often or too much can cause laryngitis  These substances include Freon gas, formaldehyde, organic mercury, sulfuric acids, and solvents   Ask your healthcare provider for more information about irritants that may cause laryngitis  · Medicines: Certain medicines such as antibiotics or inhaled steroids can increase your risk  · Previous radiation therapy: Head or neck radiation therapy earlier in life may increase your risk of having fungal laryngitis  · Respiratory infections: Colds or other respiratory infections increase your risk  · Voice stress: Your vocal cords can get stressed by overuse of your voice without rest breaks  A worse form of vocal stress is voice abuse, such as shouting or singing or talking too loud  What are the signs and symptoms of laryngitis? You may have one or more of the following:  · Breathy, raspy, and hoarse voice    · Cough    · Feeling of tightness or of something stuck in your throat    · Headache    · Nasal congestion or runny nose    · Sore throat or clearing of the throat often    · Trouble swallowing  How is laryngitis diagnosed? Your healthcare provider will ask you about your health  This may include information on what signs and symptoms you have and when they started  You may also be asked about diseases you have had  You will also be asked what medicine you are taking or have taken in the past  You may also be asked about your present job or working conditions  Ask your healthcare provider for more information on the following tests:  · Acid test: This test is also called a pH monitoring test and is usually done within a 24-hour period  It measures how often and for how long stomach acid enters your esophagus  · Biopsy: This is when sample tissues are taken from your larynx and sent to a lab for tests  This is done to check if you have fungal laryngitis  · Esophagoscopy: This test is also called an upper gastrointestinal endoscopy (EGD)  It is done to check your esophagus when you have acid reflux that stops and later comes back  Ask your healthcare provider for more information about EGD  · Laryngoscopy:  This is used to check the inside of your larynx directly using a laryngoscope  A laryngoscope is a flexible lighted tube that is inserted through your mouth into your upper airway  · Provocation test: You may be asked to breathe in or be around certain substances to check if you will have symptoms  How is laryngitis treated? Laryngitis may go away on its own  If your condition gets worse, you may be given any of the following treatments:  · Medicines:      ¨ Antibiotics: This medicine is given to help treat or prevent an infection caused by bacteria  ¨ Antifungal medicines: These medicines are given to treat fungal laryngitis  Ask your healthcare provider for more information on antifungal medicines  ¨ Antiacid medicines: These medicines, called proton pump inhibitors, are used to decrease the amount of acid made by your stomach  · Other treatments:      ¨ Air humidifier:  Using a humidifier adds moisture to the air in your home  The moist air makes it easier to cough up mucus from your lungs  This may also help your laryngitis heal faster  ¨ Increasing liquid intake: You may need to increase the amount of liquids you drink each day  Drink even more liquids if you will be outdoors in the sun for a long time  You should also drink more liquids if you are exercising  Try to drink enough liquid each day and not just when you feel thirsty  ¨ Voice rest:  Complete voice rest for a few days may be needed until your symptoms improved  You may need to limit using your voice for a while if complete voice rest is not possible  How can laryngitis be prevented? · Avoid being around irritating and harmful substances:  Protect your larynx from substances that can cause laryngitis  These include things to which you are allergic, alcohol, and irritating chemicals  · Change your diet if you have reflux: This may include avoiding fatty foods, chocolate, peppermint, carbonated drinks, alcohol, and caffeine   Also avoid spicy and acidic foods like citrus, pineapple, salad dressings, hot sauces, and alexandra  These foods will cause belching and may worsen your acid reflux  Ask your healthcare provider for other types of food that may not be right for you  · Quit smoking: It is never too late to quit smoking  Smoking irritates your throat and larynx and harms your heart and lungs  You are more likely to have a heart attack, lung disease, and cancer if you smoke  You will help yourself and those around you by not smoking  Ask your healthcare provider for more information on how to stop smoking if you are having trouble quitting  · Take care of your voice:  Warm up your voice before making it work hard  Avoid shouting or singing too loud  Rest your voice for some time to help prevent your larynx from getting inflamed  Ask your healthcare provider for more information about how best to take care of your voice  When should I call my healthcare provider? · You have a fever  · You have large, tender lumps in your neck  · Your hoarseness lasts longer than 7 days  · You have new or increased throat pain  When should I seek immediate care or call 911? · Your throat is bleeding  · You are hoarse for more than 7 days and your chest feels tight  · You have sudden trouble breathing  · You have severe drooling or trouble swallowing  CARE AGREEMENT:   You have the right to help plan your care  Learn about your health condition and how it may be treated  Discuss treatment options with your caregivers to decide what care you want to receive  You always have the right to refuse treatment  The above information is an  only  It is not intended as medical advice for individual conditions or treatments  Talk to your doctor, nurse or pharmacist before following any medical regimen to see if it is safe and effective for you    © 2017 [employer] Information is for End User's use only and may not be sold, redistributed or otherwise used for commercial purposes  All illustrations and images included in CareNotes® are the copyrighted property of A D [employer] M , Inc  or Michael Garcia

## 2019-09-26 DIAGNOSIS — F48.9 MENTAL HEALTH PROBLEM: Primary | ICD-10-CM

## 2019-09-26 NOTE — PROGRESS NOTES
Please assist this patient with finding mental health resources  Patient is interested in psychotherapy        Thank you

## 2019-10-15 ENCOUNTER — PATIENT OUTREACH (OUTPATIENT)
Dept: FAMILY MEDICINE CLINIC | Facility: CLINIC | Age: 24
End: 2019-10-15

## 2019-10-15 NOTE — PROGRESS NOTES
GERONIMO GODWIN received referral from Dr Kathy Elaine as Pt was identified at time of appointment that she requires psychotherapy and was willing to receive mental health outpatient resources to schedule an intake  GERONIMO GODWIN reached out to Pt by phone  Pt reported that she is doing well at this time  Pt confirmed that upon last visit, she was agreeable to receive and reinstate outpatient mental health resources as part of her care plan  Pt stated that as a child, she had gone to [address] [id number] and another outpatient mental health clinic however she stopped going due to feeling uncomfortable with the therapist  GERONIMO GODWIN encouraged Pt that if she ever feels uncomfortable with a therapist, to be sure to communicate that with the practice so that they may assign her to another therapist  Pt expressed agreement  GERONIMO GODWIN asked Pt who her support system is and Pt responded that she is very close with her sister  Pt stated that she does not drive however she utilizes [employer] often  GERONIMO GODWIN asked Pt if she would be able to schedule her own intake appointment if provided with the information and she stated yes  GERONIMO GODWIN expressed to Pt that she will send the information in the mail today to include outpatient mental health clinics in the area that accept her insurance as well as GERONIMO GODWIN contact information to contact if she is having any difficultly  Pt expressed gratitude and understanding  GERONIMO GODWIN will put information in the mail today and remain available for any further assistance as needed

## 2019-10-24 ENCOUNTER — APPOINTMENT (EMERGENCY)
Dept: RADIOLOGY | Facility: HOSPITAL | Age: 24
End: 2019-10-24
Payer: COMMERCIAL

## 2019-10-24 ENCOUNTER — HOSPITAL ENCOUNTER (EMERGENCY)
Facility: HOSPITAL | Age: 24
Discharge: HOME | End: 2019-10-24
Attending: EMERGENCY MEDICINE
Payer: COMMERCIAL

## 2019-10-24 VITALS
SYSTOLIC BLOOD PRESSURE: 128 MMHG | BODY MASS INDEX: 42.43 KG/M2 | HEART RATE: 83 BPM | DIASTOLIC BLOOD PRESSURE: 76 MMHG | WEIGHT: 255 LBS | RESPIRATION RATE: 18 BRPM | TEMPERATURE: 97 F | OXYGEN SATURATION: 98 %

## 2019-10-24 DIAGNOSIS — S20.212A CHEST WALL CONTUSION, LEFT, INITIAL ENCOUNTER: Primary | ICD-10-CM

## 2019-10-24 LAB
EXT PREG TEST URINE: NEGATIVE
EXT. CONTROL ED NAV: NORMAL

## 2019-10-24 PROCEDURE — 99284 EMERGENCY DEPT VISIT MOD MDM: CPT | Performed by: EMERGENCY MEDICINE

## 2019-10-24 PROCEDURE — 81025 URINE PREGNANCY TEST: CPT | Performed by: EMERGENCY MEDICINE

## 2019-10-24 PROCEDURE — 99284 EMERGENCY DEPT VISIT MOD MDM: CPT

## 2019-10-24 PROCEDURE — 71046 X-RAY EXAM CHEST 2 VIEWS: CPT

## 2019-10-24 PROCEDURE — 96372 THER/PROPH/DIAG INJ SC/IM: CPT

## 2019-10-24 RX ORDER — IBUPROFEN 600 MG/1
600 TABLET ORAL EVERY 6 HOURS PRN
Qty: 20 TABLET | Refills: 0 | Status: SHIPPED | OUTPATIENT
Start: 2019-10-24 | End: 2019-11-06 | Stop reason: HOSPADM

## 2019-10-24 RX ORDER — KETOROLAC TROMETHAMINE 30 MG/ML
30 INJECTION, SOLUTION INTRAMUSCULAR; INTRAVENOUS ONCE
Status: COMPLETED | OUTPATIENT
Start: 2019-10-24 | End: 2019-10-24

## 2019-10-24 RX ADMIN — KETOROLAC TROMETHAMINE 30 MG: 30 INJECTION, SOLUTION INTRAMUSCULAR at 21:59

## 2019-10-25 NOTE — ED TRIAGE NOTES
Pt states she [place] yesterday [place] the movie theater- states she didn't see the gap and tripped and hit the chairs in front of her and hit her breast on the chair- L breast  Denies LOC  Reports laughing hurts  Reports it hurts to take a deep breath  Hurts to breath  Denies taking anything at home for the pain   Denies abd pain

## 2019-10-25 NOTE — ED PROVIDER NOTES
History  Chief Complaint   Patient presents with    Fall     Patient is a 24-year-old female complaining of a 1 day history sharp constant left chest pain that does not radiate  Patient sustained a fall on to a recline moving he received yesterday when trying to step around someone  Landed directly on the left chest   No head trauma or loss conscious  Pain is worse with cough laughing and deep inspiration  Took 2000 mg of Tylenol yesterday without any relief  Did not take anything today  No fevers sweats or chills  Prior to Admission Medications   Prescriptions Last Dose Informant Patient Reported? Taking? albuterol (PROVENTIL HFA,VENTOLIN HFA) 90 mcg/act inhaler Past Week at Unknown time  No Yes   Sig: Inhale 2 puffs every 4 (four) hours as needed for wheezing   aspirin-acetaminophen-caffeine (EXCEDRIN MIGRAINE) 250-250-65 MG per tablet Past Month at Unknown time  Yes Yes   Sig: Take 2 tablets by mouth as needed for headaches   benzonatate (TESSALON PERLES) 100 mg capsule Past Month at Unknown time  No Yes   Sig: Take 1 capsule (100 mg total) by mouth 3 (three) times a day as needed for cough   metFORMIN (GLUCOPHAGE) 500 mg tablet Not Taking at Unknown time  No No   Sig: Take 1 tablet (500 mg total) by mouth daily with breakfast   Patient not taking: Reported on 9/25/2019      Facility-Administered Medications: None       Past Medical History:   Diagnosis Date    ADHD (attention deficit hyperactivity disorder)     Bipolar 1 disorder (HCC)     PCOS (polycystic ovarian syndrome) 2015       Past Surgical History:   Procedure Laterality Date    NO PAST SURGERIES         Family History   Problem Relation Age of Onset    Cancer Mother         cervical    Breast cancer Neg Hx      I have reviewed and agree with the history as documented  Social History     Tobacco Use    Smoking status: Current Some Day Smoker    Smokeless tobacco: Never Used   Substance Use Topics    Alcohol use:  Yes Comment: 1 X month    Drug use: No        Review of Systems   Constitutional: Negative  HENT: Negative  Eyes: Negative  Respiratory: Negative  Negative for cough and shortness of breath  Cardiovascular: Positive for chest pain  Gastrointestinal: Negative  Negative for abdominal pain  Endocrine: Negative  Genitourinary: Negative  Musculoskeletal: Negative  Skin: Negative  Allergic/Immunologic: Negative  Neurological: Negative  Hematological: Negative  Psychiatric/Behavioral: Negative  All other systems reviewed and are negative  Physical Exam  Physical Exam   Constitutional: She is oriented to person, place, and time  She appears well-developed and well-nourished  HENT:   Head: Normocephalic  Right Ear: External ear normal    Left Ear: External ear normal    Nose: Nose normal    Mouth/Throat: Oropharynx is clear and moist    Eyes: Pupils are equal, round, and reactive to light  Conjunctivae are normal    Neck: Normal range of motion  Neck supple  Cardiovascular: Normal rate, regular rhythm, normal heart sounds and intact distal pulses  Pulmonary/Chest: Effort normal and breath sounds normal  She exhibits tenderness  Patient with reproducible tenderness palpation over the upper aspect of the left breast    Abdominal: Soft  Bowel sounds are normal    Musculoskeletal: Normal range of motion  Neurological: She is alert and oriented to person, place, and time  Skin: Skin is warm and dry  Capillary refill takes less than 2 seconds  Psychiatric: She has a normal mood and affect  Her behavior is normal    Nursing note and vitals reviewed        Vital Signs  ED Triage Vitals [10/24/19 2140]   Temperature Pulse Respirations Blood Pressure SpO2   (!) 97 °F (36 1 °C) 96 22 132/81 98 %      Temp Source Heart Rate Source Patient Position - Orthostatic VS BP Location FiO2 (%)   Tympanic Monitor Sitting Left arm --      Pain Score       8           Vitals:    10/24/19 2140 10/24/19 2229   BP: 132/81 128/76   Pulse: 96 83   Patient Position - Orthostatic VS: Sitting Sitting         Visual Acuity      ED Medications  Medications   ketorolac (TORADOL) injection 30 mg (30 mg Intramuscular Given 10/24/19 2159)       Diagnostic Studies  Results Reviewed     Procedure Component Value Units Date/Time    POCT pregnancy, urine [[id number]]  (Normal) Resulted:  10/24/19 2156    Lab Status:  Final result Updated:  10/24/19 2157     EXT PREG TEST UR (Ref: Negative) NEGATIVE     Control valid                 XR chest pa & lateral   ED Interpretation by Rukhsana Moore MD (10/24 2215)   No fx  Procedures  Procedures       ED Course  ED Course as of Oct 25 0035   Thu Oct 24, 2019   2220 One over results with patient  Will call back if x-rays read differently by Radiology  Will discharge with Motrin  Advised follow up with PCP return the ER for any concerns  MDM  Number of Diagnoses or Management Options  Chest wall contusion, left, initial encounter:      Amount and/or Complexity of Data Reviewed  Tests in the radiology section of CPT®: ordered and reviewed  Review and summarize past medical records: yes  Independent visualization of images, tracings, or specimens: yes        Disposition  Final diagnoses:   Chest wall contusion, left, initial encounter     Time reflects when diagnosis was documented in both MDM as applicable and the Disposition within this note     Time User Action Codes Description Comment    10/24/2019 10:20 PM Shanon Trevino Chest wall contusion, left, initial encounter       ED Disposition     ED Disposition Condition Date/Time Comment    Discharge Stable Thu Oct 24, 2019 10:20 PM Kaur Ferguson discharge to home/self care              Follow-up Information     Follow up With Specialties Details Why Mellissa Ramirez, Radha  [address] [place] [address]  [phone]            Discharge Medication List as of 10/24/2019 10:21 PM      START taking these medications    Details   ibuprofen (MOTRIN) 600 mg tablet Take 1 tablet (600 mg total) by mouth every 6 (six) hours as needed for mild pain, Starting Thu 10/24/2019, Print         CONTINUE these medications which have NOT CHANGED    Details   albuterol (PROVENTIL HFA,VENTOLIN HFA) 90 mcg/act inhaler Inhale 2 puffs every 4 (four) hours as needed for wheezing, Starting Mon 9/23/2019, Print      aspirin-acetaminophen-caffeine (EXCEDRIN MIGRAINE) 250-250-65 MG per tablet Take 2 tablets by mouth as needed for headaches, Historical Med      benzonatate (TESSALON PERLES) 100 mg capsule Take 1 capsule (100 mg total) by mouth 3 (three) times a day as needed for cough, Starting Wed 9/25/2019, Normal      metFORMIN (GLUCOPHAGE) 500 mg tablet Take 1 tablet (500 mg total) by mouth daily with breakfast, Starting Mon 4/15/2019, Normal           No discharge procedures on file      ED Provider  Electronically Signed by           Trinidad Parra MD  10/25/19 4818

## 2019-11-01 ENCOUNTER — HOSPITAL ENCOUNTER (INPATIENT)
Facility: HOSPITAL | Age: 24
LOS: 4 days | Discharge: HOME | DRG: 753 | End: 2019-11-06
Attending: EMERGENCY MEDICINE | Admitting: PSYCHIATRY & NEUROLOGY
Payer: COMMERCIAL

## 2019-11-01 DIAGNOSIS — J45.20 MILD INTERMITTENT ASTHMA WITHOUT COMPLICATION: ICD-10-CM

## 2019-11-01 DIAGNOSIS — F31.4 BIPOLAR DISORDER WITH SEVERE DEPRESSION (HCC): Primary | ICD-10-CM

## 2019-11-01 LAB
AMPHETAMINES SERPL QL SCN: NEGATIVE
BACTERIA UR QL AUTO: ABNORMAL /HPF
BARBITURATES UR QL: NEGATIVE
BENZODIAZ UR QL: NEGATIVE
BILIRUB UR QL STRIP: NEGATIVE
CLARITY UR: CLEAR
COCAINE UR QL: NEGATIVE
COLOR UR: YELLOW
ETHANOL EXG-MCNC: 0 MG/DL
EXT PREG TEST URINE: NEGATIVE
EXT. CONTROL ED NAV: NORMAL
GLUCOSE UR STRIP-MCNC: NEGATIVE MG/DL
HGB UR QL STRIP.AUTO: NEGATIVE
KETONES UR STRIP-MCNC: NEGATIVE MG/DL
LEUKOCYTE ESTERASE UR QL STRIP: 500
METHADONE UR QL: NEGATIVE
NITRITE UR QL STRIP: NEGATIVE
NON-SQ EPI CELLS URNS QL MICRO: ABNORMAL /HPF
OPIATES UR QL SCN: NEGATIVE
PCP UR QL: NEGATIVE
PH UR STRIP.AUTO: 6 [PH]
PROT UR STRIP-MCNC: NEGATIVE MG/DL
RBC #/AREA URNS AUTO: ABNORMAL /HPF
SP GR UR STRIP.AUTO: 1.02 (ref 1–1.04)
THC UR QL: NEGATIVE
UROBILINOGEN UA: NEGATIVE MG/DL
WBC #/AREA URNS AUTO: ABNORMAL /HPF

## 2019-11-01 PROCEDURE — 81025 URINE PREGNANCY TEST: CPT | Performed by: EMERGENCY MEDICINE

## 2019-11-01 PROCEDURE — 99284 EMERGENCY DEPT VISIT MOD MDM: CPT | Performed by: EMERGENCY MEDICINE

## 2019-11-01 PROCEDURE — 80307 DRUG TEST PRSMV CHEM ANLYZR: CPT | Performed by: EMERGENCY MEDICINE

## 2019-11-01 PROCEDURE — 81001 URINALYSIS AUTO W/SCOPE: CPT | Performed by: EMERGENCY MEDICINE

## 2019-11-01 PROCEDURE — 99285 EMERGENCY DEPT VISIT HI MDM: CPT

## 2019-11-01 PROCEDURE — 81003 URINALYSIS AUTO W/O SCOPE: CPT | Performed by: EMERGENCY MEDICINE

## 2019-11-01 PROCEDURE — 82075 ASSAY OF BREATH ETHANOL: CPT | Performed by: EMERGENCY MEDICINE

## 2019-11-01 RX ORDER — LORAZEPAM 0.5 MG/1
1 TABLET ORAL ONCE
Status: COMPLETED | OUTPATIENT
Start: 2019-11-01 | End: 2019-11-01

## 2019-11-01 RX ADMIN — LORAZEPAM 1 MG: 0.5 TABLET ORAL at 22:01

## 2019-11-02 PROCEDURE — 99214 OFFICE O/P EST MOD 30 MIN: CPT | Performed by: FAMILY MEDICINE

## 2019-11-02 PROCEDURE — 99222 1ST HOSP IP/OBS MODERATE 55: CPT | Performed by: PSYCHIATRY & NEUROLOGY

## 2019-11-02 RX ORDER — HALOPERIDOL 5 MG/ML
5 INJECTION INTRAMUSCULAR EVERY 8 HOURS PRN
Status: DISCONTINUED | OUTPATIENT
Start: 2019-11-02 | End: 2019-11-02

## 2019-11-02 RX ORDER — HALOPERIDOL 5 MG
5 TABLET ORAL EVERY 8 HOURS PRN
Status: DISCONTINUED | OUTPATIENT
Start: 2019-11-02 | End: 2019-11-02

## 2019-11-02 RX ORDER — HALOPERIDOL 5 MG
5 TABLET ORAL EVERY 8 HOURS PRN
Status: DISCONTINUED | OUTPATIENT
Start: 2019-11-02 | End: 2019-11-06 | Stop reason: HOSPADM

## 2019-11-02 RX ORDER — ACETAMINOPHEN 325 MG/1
650 TABLET ORAL EVERY 4 HOURS PRN
Status: DISCONTINUED | OUTPATIENT
Start: 2019-11-02 | End: 2019-11-02

## 2019-11-02 RX ORDER — BENZONATATE 100 MG/1
100 CAPSULE ORAL 3 TIMES DAILY PRN
Status: DISCONTINUED | OUTPATIENT
Start: 2019-11-02 | End: 2019-11-06 | Stop reason: HOSPADM

## 2019-11-02 RX ORDER — TRAZODONE HYDROCHLORIDE 50 MG/1
50 TABLET ORAL
Status: DISCONTINUED | OUTPATIENT
Start: 2019-11-02 | End: 2019-11-06 | Stop reason: HOSPADM

## 2019-11-02 RX ORDER — BENZTROPINE MESYLATE 1 MG/ML
2 INJECTION INTRAMUSCULAR; INTRAVENOUS EVERY 6 HOURS PRN
Status: DISCONTINUED | OUTPATIENT
Start: 2019-11-02 | End: 2019-11-06 | Stop reason: HOSPADM

## 2019-11-02 RX ORDER — ACETAMINOPHEN 325 MG/1
650 TABLET ORAL EVERY 6 HOURS PRN
Status: DISCONTINUED | OUTPATIENT
Start: 2019-11-02 | End: 2019-11-06 | Stop reason: HOSPADM

## 2019-11-02 RX ORDER — HALOPERIDOL 5 MG/ML
5 INJECTION INTRAMUSCULAR EVERY 8 HOURS PRN
Status: DISCONTINUED | OUTPATIENT
Start: 2019-11-02 | End: 2019-11-06 | Stop reason: HOSPADM

## 2019-11-02 RX ORDER — ACETAMINOPHEN 325 MG/1
650 TABLET ORAL EVERY 6 HOURS PRN
Status: DISCONTINUED | OUTPATIENT
Start: 2019-11-02 | End: 2019-11-02 | Stop reason: SDUPTHER

## 2019-11-02 RX ORDER — MAGNESIUM HYDROXIDE/ALUMINUM HYDROXICE/SIMETHICONE 120; 1200; 1200 MG/30ML; MG/30ML; MG/30ML
15 SUSPENSION ORAL EVERY 4 HOURS PRN
Status: DISCONTINUED | OUTPATIENT
Start: 2019-11-02 | End: 2019-11-06 | Stop reason: HOSPADM

## 2019-11-02 RX ORDER — GABAPENTIN 100 MG/1
100 CAPSULE ORAL 3 TIMES DAILY
Status: DISCONTINUED | OUTPATIENT
Start: 2019-11-02 | End: 2019-11-06 | Stop reason: HOSPADM

## 2019-11-02 RX ORDER — IBUPROFEN 600 MG/1
600 TABLET ORAL EVERY 8 HOURS PRN
Status: DISCONTINUED | OUTPATIENT
Start: 2019-11-02 | End: 2019-11-06 | Stop reason: HOSPADM

## 2019-11-02 RX ORDER — RISPERIDONE 1 MG/1
1 TABLET, ORALLY DISINTEGRATING ORAL EVERY 12 HOURS PRN
Status: DISCONTINUED | OUTPATIENT
Start: 2019-11-02 | End: 2019-11-06 | Stop reason: HOSPADM

## 2019-11-02 RX ORDER — IBUPROFEN 400 MG/1
800 TABLET ORAL EVERY 8 HOURS PRN
Status: DISCONTINUED | OUTPATIENT
Start: 2019-11-02 | End: 2019-11-02

## 2019-11-02 RX ORDER — ACETAMINOPHEN 325 MG/1
325 TABLET ORAL EVERY 6 HOURS PRN
Status: DISCONTINUED | OUTPATIENT
Start: 2019-11-02 | End: 2019-11-06 | Stop reason: HOSPADM

## 2019-11-02 RX ORDER — ALBUTEROL SULFATE 90 UG/1
2 AEROSOL, METERED RESPIRATORY (INHALATION) EVERY 4 HOURS PRN
Status: DISCONTINUED | OUTPATIENT
Start: 2019-11-02 | End: 2019-11-06 | Stop reason: HOSPADM

## 2019-11-02 RX ORDER — BENZTROPINE MESYLATE 2 MG/1
2 TABLET ORAL EVERY 6 HOURS PRN
Status: DISCONTINUED | OUTPATIENT
Start: 2019-11-02 | End: 2019-11-06 | Stop reason: HOSPADM

## 2019-11-02 RX ORDER — HYDROXYZINE HYDROCHLORIDE 25 MG/1
25 TABLET, FILM COATED ORAL EVERY 6 HOURS PRN
Status: DISCONTINUED | OUTPATIENT
Start: 2019-11-02 | End: 2019-11-06 | Stop reason: HOSPADM

## 2019-11-02 RX ADMIN — GABAPENTIN 100 MG: 100 CAPSULE ORAL at 21:11

## 2019-11-02 RX ADMIN — CARIPRAZINE 1.5 MG: 1.5 CAPSULE, GELATIN COATED ORAL at 19:44

## 2019-11-02 RX ADMIN — HYDROXYZINE HYDROCHLORIDE 25 MG: 25 TABLET ORAL at 03:20

## 2019-11-02 RX ADMIN — TRAZODONE HYDROCHLORIDE 50 MG: 50 TABLET ORAL at 21:11

## 2019-11-02 NOTE — PLAN OF CARE
Problem: Depression  Goal: Refrain from harming self  Description  Interventions:  - Monitor patient closely, per order   - Supervise medication ingestion, monitor effects and side effects   Outcome: Progressing     Problem: Depression  Goal: Refrain from isolation  Description  Interventions:  - Develop a trusting relationship   - Encourage socialization   Outcome: Not Progressing   Patient isolative most of the shift  She did not have breakfast, ate lunch and dinner  Patient slept most of the day  No complaints of Si, Hi, audio orn visual hallucinations  Will continue to monitor for changes in current status  n

## 2019-11-02 NOTE — ED NOTES
[facility] bed became available  This patient referred to 3P for review

## 2019-11-02 NOTE — ED NOTES
Patient is accepted at [facility]  Patient is accepted by Dr Emperatriz Vasques per insight    Nurse report is to be called to  prior to patient transfer

## 2019-11-02 NOTE — CONSULTS
Subjective:  Patient is a 30year old female with PMH significant for Mild intermittent Asthma and chronic midline lower back pain and past psychiatric history significant for bipolar and ADHD who presents to the hospital with increased depression and suicidal ideation with plan  Patient was seen and examined at bedside today  She is currently admitted to the behavioral health unit  She denies any acute medical concerns or complaints  ROS unremarkable  Objective:   Vital signs: WNL  Physical exam:  Lungs: Clear to auscultation bilaterally, no wheezing  Heart: s1 and s2 head, no murmurs, gallps or rubs noted on auscultation  Abdomen: Abdominal sounds are audible in all four quadrants  No distension or tenderness to palpation  Assessment and Plan:  1  Suicidal Ideation: Current plan of jumping from bridge  - Continue plan as directed by behavioral medicine  2  Mild Intermittent asthma: Denies any current symptoms   - Continue Albuterol inhaler 2 puff, q4hr PRN  3  Back pain: No current complaints  - Continue Tylenol 325 mg q6hr PRN for mild pain and 650 mg q6hr for moderate pain

## 2019-11-02 NOTE — ED NOTES
EVS (Eligibility Verification System)  Automated system indicates:   PROMISe: [address] Extension for Transportation:    [facility] to be completed if transported

## 2019-11-02 NOTE — PROGRESS NOTES
11/02/19 1400   Activity/Group Checklist   Group Other (Comment)  (OPEN STUDIO/Art Therapy, Social Interaction)   Attendance Attended   Attendance Duration (min) Greater than 60   Interactions Interacted appropriately   Affect/Mood Appropriate   Goals Achieved Able to listen to others; Able to engage in interactions

## 2019-11-02 NOTE — PROGRESS NOTES
11/02/19 1200   [address] [address]   Provider Name Hemant Ryan    Multi-disciplinary Rounds   Diagnosis  Reviewed   Code Status Reviewed   Pending Pathology and Pertinent Tests Most recent imaging studies reviewed   Pain Assessments and Goals Current Pain Score (comment)   Vital Signs Reviewed   Nutrition  Reviewed   Elimination (I/O's) Reviewed   Level of care Current level of care is appropriate   Dr Hemant Ryan consulted

## 2019-11-02 NOTE — H&P
Psychiatric Evaluation - Behavioral Health     Identification Data:Soni Valenzuela 30 y o  female MRN: [id number]  Unit/Bed#: [facility] 377-02 Encounter: [id number]    Chief Complaint: "I wanted to die", "I thought I was losing my mind", "I don't want to go on living like this", depression and suicidal ideation    History of Present Illness     Frannie Ralph is a 30 y o  female with a history of Bipolar Disorder who was admitted to the inpatient psychiatric unit on a voluntary 201 commitment basis due to depression, anxiety and unstable mood  Symptoms prior to admission included worsening depression, depression, feeling depressed, suicidal behavior and feeling suicidal  Onset of symptoms was gradual starting several weeks ago with progressively worsening course since that time  Stressors preceding admission included relationship problems  On initial evaluation after admission to the inpatient psychiatric unit the patient sad and tearful    Was suicidal yesterday  Depressed  Best friend asked the pt to leave after arguments  Pt has bipolar disorder, not taking medications for the last 2-3 yrs, with worsening depression  Feel sad and "hurt" anytime  Visibly depressed  Feels lonely and isolated  Chronic pain complicated the patient's mood  The patient stated he did not take any narcotics for pain  Takes over-the-counter medications  Was in [address] [place] [address], 10-16 years ago  Age 8 - angry all the time, because "not getting support"  Pt is the oldest, has sisters       Psychiatric Review Of Systems:    sleep changes: decreased  appetite changes: decreased  energy/anergy: decreased  anxiety/panic: yes  ting: past mixed episodes, significant mood swings, mood swings  self injurious behavior/risky behavior: not recently  Suicidal ideation: yes  Homicidal ideation: no  Auditory hallucinations: no  Visual hallucinations: no  Delusional thinking: paranoid thoughts      Historical Information     Past Psychiatric History:     Past Inpatient Psychiatric Treatment:   One past inpatient psychiatric admission  Past Outpatient Psychiatric Treatment:    Was in outpatient psychiatric treatment in the past with a psychiatrist SANJIV  Past Suicide Attempts:      yes, by cutting self and jumping off a bridge   Past Psychiatric Medication Trials:       Seroquel, tegretol and Strattera  "felt like Zombie"  Sleeping days along do not eat        Substance Abuse History:  Social History     Tobacco History     Smoking Status  Current Some Day Smoker    Smokeless Tobacco Use  Never Used          Alcohol History     Alcohol Use Status  Yes Comment  1 X month          Drug Use     Drug Use Status  No          Sexual Activity     Sexually Active  Not Asked          Activities of Daily Living    Not Asked               Additional Substance Use Detail     Questions Responses    Alcohol Use Frequency Denies use in past 12 months    Cannabis frequency Never used    Comment: Never used on 11/2/2019     Heroin Frequency Denies use in past 12 months    Cocaine frequency Never used    Comment: Never used on 11/2/2019     Crack Cocaine Frequency Denies use in past 12 months    Methamphetamine Frequency Denies use in past 12 months    Narcotic Frequency Denies use in past 12 months    Benzodiazepine Frequency Denies use in past 12 months    Amphetamine frequency Denies use in past 12 months    Barbituate Frequency Denies use use in past 12 months    Inhalant frequency Never used    Comment: Never used on 11/2/2019     Hallucinogen frequency Never used    Comment: Never used on 11/2/2019     Ecstasy frequency Never used    Comment: Never used on 11/2/2019     Other drug frequency Never used    Comment: Never used on 11/2/2019     Opiate frequency Denies use in past 12 months    Last reviewed by Angela Lee RN on 11/2/2019        I have assessed this patient for substance use within the past 12 months    History of Inpatient/Outpatient rehabilitation program: no  Smoking history: denies current use    Family Psychiatric History:     Psychiatric Illness: Mother - depression, Father - bipolar disorder  Substance Abuse:  unknown  Suicide Attempts:  patient denies    Social History:    Education: high school diploma/GED  Marital History: single  Occupational History: worked at [employer] in the past          Traumatic History:     Abuse: not willing to provide details    Past Medical History:    History of Seizures: no    Past Medical History:   Diagnosis Date    ADHD (attention deficit hyperactivity disorder)     Bipolar 1 disorder ([address] )     PCOS (polycystic ovarian syndrome) 2015     Past Surgical History:   Procedure Laterality Date    NO PAST SURGERIES         Medical Review Of Systems:    EFO Review Of Systems: Constitutional: negative  Respiratory: negative  Cardiovascular: negative  Gastrointestinal: negative  Musculoskeletal:negative  Neurological: negative  Endocrine: negative    Allergies:    No Known Allergies    Medications: All current active medications have been reviewed      Objective     Vital signs in last 24 hours:    Temp:  [97 6 °F (36 4 °C)-98 1 °F (36 7 °C)] 98 1 °F (36 7 °C)  HR:  [85-89] 85  Resp:  [18] 18  BP: (119-163)/(76-89) 119/76    No intake or output data in the 24 hours ending 11/02/19 1310     Mental Status Evaluation:      Appearance:  dressed in hospital attire   Behavior:  calm   Mood:  depressed, anxious   Affect: constricted    Speech:  decreased rate, slow   Language: appropriate   Thought Process:  concrete   Associations: concrete associations   Thought Content:  intrusive thoughts, negative thinking   Perceptual Disturbances: denies auditory hallucinations when asked, does not appear responding to internal stimuli   Risk Potential: Suicidal ideation - None at present  Homicidal ideation - None at present  Potential for aggression - No   Sensorium:  oriented to person, place and time   Memory:  recent and remote memory grossly intact   Consciousness:  alert and awake   Attention: attention span and concentration are normal   Fund of Knowledge: awareness of current events appropriate   Insight:  impaired   Judgment: limited   Muscle Tone: normal   Gait/Station: normal gait/station and normal balance   Motor Activity: no abnormal movements               Laboratory Results:   I have personally reviewed all pertinent laboratory/tests results  Most Recent Labs:   Lab Results   Component Value Date    WBC 8 90 03/30/2019    RBC 4 63 03/30/2019    HGB 13 9 03/30/2019    HCT 41 6 03/30/2019     03/30/2019    RDW 14 3 03/30/2019    NEUTROABS 6 50 03/30/2019    SODIUM 137 04/05/2019    K 4 4 04/05/2019     04/05/2019    CO2 25 04/05/2019    BUN 12 04/05/2019    CREATININE 0 53 (L) 04/05/2019    GLUCOSE 102 (H) 04/10/2018    GLUC 148 (H) 03/30/2019    GLUF 98 04/05/2019    CALCIUM 9 4 04/05/2019    AST 49 (H) 04/05/2019    ALT 54 (H) 04/05/2019    ALKPHOS 80 04/05/2019    TP 7 4 04/05/2019    ALB 4 4 04/05/2019    TBILI 0 70 04/05/2019    UME0YHEABJVC 2 950 04/05/2019    PREGSERUM Negative 01/23/2019    HCGQUANT <3 01/23/2019    HGBA1C 5 5 09/25/2019     04/05/2019       Imaging Studies: Xr Chest Pa & Lateral    Result Date: 10/25/2019  Narrative: CHEST INDICATION:   fall, left-sided chest pain  COMPARISON:  None EXAM PERFORMED/VIEWS:  XR CHEST PA & LATERAL FINDINGS: Cardiomediastinal silhouette appears unremarkable  The lungs are clear  No pneumothorax or pleural effusion  Osseous structures appear within normal limits for age  Impression: No acute cardiopulmonary disease  Workstation performed: [id number]       Code Status: Level 1 - Full Code    Assessment/Plan   Principal Problem:    Bipolar depression ([facility] [address] )  Active Problems:    Mild intermittent asthma without complication    Chronic midline low back pain without sciatica    Prediabetes      Treatment Plan:     Planned Treatment and Medication Changes:     All current active medications have been reviewed  Encourage group therapy, milieu therapy and occupational therapy  Behavioral Health checks every 7 minutes  Start medications to treat Bipolar disorder  Cariprazone was selected because this mediciene can treat derepsee and mixed pisded  Has a favorite side effect proviede  Will start low dose of Neurontin to address patient's chronic likely neuropathic pain, will increase as tolerated  Still low-dose was started because the patient can feel sedated that was unpleasant side effects for the patient  Current Facility-Administered Medications:  acetaminophen 325 mg Oral Q6H PRN Kathrine Rosario MD   acetaminophen 650 mg Oral Q4H PRN Kathrine Rosario MD   albuterol 2 puff Inhalation Q4H PRN Santosh eDe MD   aluminum-magnesium hydroxide-simethicone 15 mL Oral Q4H PRN Kathrine Rosario MD   benzonatate 100 mg Oral TID PRN Santosh Dee MD   benztropine 2 mg Intramuscular Q6H PRN Kathrine Rosario MD   benztropine 2 mg Oral Q6H PRN Kathrine Rosario MD   haloperidol 5 mg Oral Q8H PRN Santosh Dee MD   haloperidol lactate 5 mg Intramuscular Q8H PRN Santosh Dee MD   hydrOXYzine HCL 25 mg Oral Q6H PRN Kathrine Rosario MD   ibuprofen 800 mg Oral Q8H PRN Kathrine Rosario MD   influenza vaccine 0 5 mL Intramuscular Once Santosh Dee MD   magnesium hydroxide 20 mL Oral Daily PRN Kathrine Rosario MD   risperiDONE 1 mg Oral Q12H PRN Kathrine Rosario MD       Risks / Benefits of Treatment:    Risks, benefits, and possible side effects of medications explained to patient including risk of parkinsonian symptoms, Tardive Dyskinesia and metabolic syndrome related to treatment with antipsychotic medications  The patient verbalizes understanding and agreement for treatment  Counseling / Coordination of Care:    Patient's presentation on admission and proposed treatment plan discussed with staff    Diagnosis, medication changes and treatment plan reviewed with patient  Inpatient Psychiatric Certification:    Estimated length of stay: 7 midnights    Based upon physical, mental and social evaluations, I certify that inpatient psychiatric services are medically necessary for this patient for a duration of 7 midnights for the treatment of Bipolar depression ([facility] [address] )    Available alternative community resources do not meet the patient's mental health care needs  I further attest that an established written individualized plan of care has been implemented and is outlined in the patient's medical records  ** Please Note: This note has been constructed using a voice recognition system   **
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
## 2019-11-02 NOTE — ED NOTES
Patient signed 12  Patient requesting to stay close so that the sister can be involved in her treatment  Patient was reminded that she needs to focus on herself right now and that she can call her sister from wherever she is placed  Patient sister agreed with this statement and added that she should go to wherever she gets a bed so that she gets treatment  Patient okay being sent out if they can facility transport home  Patient would like to wait to see if discharges occur within [facility] Saturday morning

## 2019-11-02 NOTE — ED NOTES
Insurance Authorization for admission:   Phone call placed to [facility]  Phone number:[phone]    Spoke to Syd   3 days approved    Level of care: inpatient mental health 201  Review on 11/5/19  Authorization # call upon arrival

## 2019-11-02 NOTE — PROGRESS NOTES
11/02/19 1100   Team Meeting   Meeting Type Daily Rounds   Initial Conference Date 11/02/19   Team Members Present   Team Members Present Physician;Nurse   Physician Team Member   [facility]   Patient/Family Present   Patient Present No   Patient's Family Present No   Medical, labs and medications reviewed by team  Discharge to be determined by attending  Medication changes to be assessed
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
## 2019-11-02 NOTE — ED PROVIDER NOTES
History  Chief Complaint   Patient presents with    Suicidal     Pt states "I'm very depressed, I almost committed suicide yesterday", plan to jump off the bridge  Pt cont to report SI, denies HI/AH/VH  Pt tearful during triage  Patient is a 17-year-old female with a history of bipolar and ADHD who presents with a 1 day history of depression and suicidal ideations  Patient had a plan to jump off the bridge  Patient does have a history of cutting states has not cut in several years  Patient states that she always feels depressed despite any medication  States the last time she was on medications but 2 years ago when she was in treatment at [facility]  Patient denies any homicidal ideation and no auditory or visual hallucinations  Patient states that she feels pressure to be be mother at home  States that her mom and her best friend who she considers a sister all live together  The sister yesterday told her that she needs to leave the house because any small item will set her off and patient's immediate reactions states she is going to kill herself  Sister believes that this is a on safe environment for her to raise her kids in  Patient states she is willing to sign herself into the hospital   Denies any illicit drugs or alcohol tonight  Prior to Admission Medications   Prescriptions Last Dose Informant Patient Reported? Taking?    albuterol (PROVENTIL HFA,VENTOLIN HFA) 90 mcg/act inhaler   No No   Sig: Inhale 2 puffs every 4 (four) hours as needed for wheezing   aspirin-acetaminophen-caffeine (EXCEDRIN MIGRAINE) 250-250-65 MG per tablet   Yes No   Sig: Take 2 tablets by mouth as needed for headaches   benzonatate (TESSALON PERLES) 100 mg capsule   No No   Sig: Take 1 capsule (100 mg total) by mouth 3 (three) times a day as needed for cough   ibuprofen (MOTRIN) 600 mg tablet   No No   Sig: Take 1 tablet (600 mg total) by mouth every 6 (six) hours as needed for mild pain   metFORMIN (GLUCOPHAGE) 500 mg tablet   No No   Sig: Take 1 tablet (500 mg total) by mouth daily with breakfast   Patient not taking: Reported on 9/25/2019      Facility-Administered Medications: None       Past Medical History:   Diagnosis Date    ADHD (attention deficit hyperactivity disorder)     Bipolar 1 disorder (HCC)     PCOS (polycystic ovarian syndrome) 2015       Past Surgical History:   Procedure Laterality Date    NO PAST SURGERIES         Family History   Problem Relation Age of Onset    Cancer Mother         cervical    Breast cancer Neg Hx      I have reviewed and agree with the history as documented  Social History     Tobacco Use    Smoking status: Current Some Day Smoker    Smokeless tobacco: Never Used   Substance Use Topics    Alcohol use: Yes     Comment: 1 X month    Drug use: No        Review of Systems   Constitutional: Negative  HENT: Negative  Eyes: Negative  Respiratory: Negative  Cardiovascular: Negative  Gastrointestinal: Negative  Endocrine: Negative  Genitourinary: Negative  Musculoskeletal: Negative  Skin: Negative  Allergic/Immunologic: Negative  Neurological: Negative  Hematological: Negative  Psychiatric/Behavioral: Positive for dysphoric mood and suicidal ideas  Negative for sleep disturbance  The patient is nervous/anxious  All other systems reviewed and are negative  Physical Exam  Physical Exam   Constitutional: She is oriented to person, place, and time  She appears well-developed and well-nourished  HENT:   Head: Normocephalic  Right Ear: External ear normal    Left Ear: External ear normal    Nose: Nose normal    Mouth/Throat: Oropharynx is clear and moist    Eyes: Pupils are equal, round, and reactive to light  Conjunctivae are normal    Neck: Normal range of motion  Neck supple  Cardiovascular: Normal rate, regular rhythm, normal heart sounds and intact distal pulses     Pulmonary/Chest: Effort normal and breath sounds normal  Abdominal: Soft  Bowel sounds are normal    Musculoskeletal: Normal range of motion  Neurological: She is alert and oriented to person, place, and time  Skin: Skin is warm and dry  Capillary refill takes less than 2 seconds  Old cutting scars on bilateral upper extremities  faint   Psychiatric: Her speech is rapid and/or pressured  She is agitated  She is not actively hallucinating  Cognition and memory are normal  She expresses impulsivity  She exhibits a depressed mood  She expresses suicidal ideation  She expresses suicidal plans  She is attentive  Nursing note and vitals reviewed  Vital Signs  ED Triage Vitals [11/01/19 2036]   Temperature Pulse Respirations Blood Pressure SpO2   97 6 °F (36 4 °C) 89 18 163/89 99 %      Temp Source Heart Rate Source Patient Position - Orthostatic VS BP Location FiO2 (%)   Tympanic Monitor Sitting Right arm --      Pain Score       No Pain           Vitals:    11/01/19 2036   BP: 163/89   Pulse: 89   Patient Position - Orthostatic VS: Sitting         Visual Acuity      ED Medications  Medications - No data to display    Diagnostic Studies  Results Reviewed     Procedure Component Value Units Date/Time    POCT pregnancy, urine [[id number]]     Lab Status:  No result     UA w Reflex to Microscopic w Reflex to Culture [[id number]]     Lab Status:  No result Specimen:  Urine     Rapid drug screen, urine [[id number]]     Lab Status:  No result Specimen:  Urine     POCT alcohol breath test [[id number]]     Lab Status:  No result                  No orders to display              Procedures  Procedures       ED Course                               MDM    Disposition  Final diagnoses:   None     ED Disposition     None      Follow-up Information    None         Patient's Medications   Discharge Prescriptions    No medications on file     No discharge procedures on file      ED Provider  Electronically Signed by           Kaycee Tabares MD  11/01/19 5376

## 2019-11-03 PROCEDURE — 99232 SBSQ HOSP IP/OBS MODERATE 35: CPT | Performed by: PSYCHIATRY & NEUROLOGY

## 2019-11-03 RX ADMIN — GABAPENTIN 100 MG: 100 CAPSULE ORAL at 21:05

## 2019-11-03 RX ADMIN — TRAZODONE HYDROCHLORIDE 50 MG: 50 TABLET ORAL at 21:05

## 2019-11-03 RX ADMIN — GABAPENTIN 100 MG: 100 CAPSULE ORAL at 18:10

## 2019-11-03 RX ADMIN — GABAPENTIN 100 MG: 100 CAPSULE ORAL at 09:26

## 2019-11-03 RX ADMIN — CARIPRAZINE 1.5 MG: 1.5 CAPSULE, GELATIN COATED ORAL at 09:26

## 2019-11-03 NOTE — PROGRESS NOTES
11/03/19 1600   Team Meeting   Meeting Type Daily Rounds   Initial Conference Date 11/03/19   Team Members Present   Team Members Present Physician;Nurse   Physician Team Member Dr Davy Humphreys   Patient/Family Present   Patient Present No   Patient's Family Present No   Medical, labs and medications reviewed by team  Discharge to be determined by attending  Medication changes to be assessed

## 2019-11-03 NOTE — TREATMENT PLAN
TREATMENT PLAN REVIEW - Cochran Sole Valenzuela 30 y o  1995 female MRN: [id number]    [address] [facility] [place] [address]/[facility] 293-48 Encounter: [id number]          Admit Date/Time:  11/1/2019  8:27 PM    Treatment Team: Attending Provider: Rogelio Richardson MD; Patient Care Assistant: Marielena Sequeira; Patient Care Technician: Rhys Dyer;  Occupational Therapist: Chel Bolton; Patient Care Technician: Mary Drew; Consulting Physician: Terrance Miller MD; Consulting Physician: Flako Traylor MD; Registered Nurse: Di Carmen RN; Patient Care Assistant: Rashid Gil    Diagnosis: Principal Problem:    B[facility] ([facility] )  Active Problems:    Mild intermittent asthma without complication    Chronic midline low back pain without sciatica    Prediabetes    Patient Strengths: communication skills, compliant with medication     Patient Barriers: limited family ties, relationship issues    Short Term Goals: decrease in depressive symptoms, decrease in suicidal thoughts    Long Term Goals: improvement in depression, stabilization of mood, free of suicidal thoughts    Progress Towards Goals: starting psychitric medications as prescribed    Recommended Treatment: medication management, patient medication education, group therapy, milieu therapy, continued Behavioral Health psychiatric evaluation/assessment process     Treatment Frequency: daily medication monitoring, group and milieu therapy daily, monitoring through interdisciplinary rounds, monitoring through weekly patient care conferences    Expected Discharge Date:  4-6 days    Discharge Plan: referral for outpatient medication management with a psychiatrist, referral for outpatient psychotherapy    Treatment Plan Created/Updated By: Rogelio Richardson MD

## 2019-11-03 NOTE — NURSING NOTE
Patient visible on unit at start of shift  Pleasant and cooperative with unit routine  Upon approach, patient made eye contact  Reports anxiety  Patient denied any unmet needs  HS medication compliant  Retreated to room for sleep without issue  Will monitor

## 2019-11-03 NOTE — PROGRESS NOTES
Patient has been in her room for most of the day she has been out for meals and is pleasant to staff  Most patients have been in bed because it has been cold I gave patient a extra blanket   Which helped

## 2019-11-04 RX ADMIN — CARIPRAZINE 1.5 MG: 1.5 CAPSULE, GELATIN COATED ORAL at 08:37

## 2019-11-04 RX ADMIN — IBUPROFEN 600 MG: 600 TABLET ORAL at 21:17

## 2019-11-04 RX ADMIN — GABAPENTIN 100 MG: 100 CAPSULE ORAL at 21:17

## 2019-11-04 RX ADMIN — TRAZODONE HYDROCHLORIDE 50 MG: 50 TABLET ORAL at 21:17

## 2019-11-04 RX ADMIN — GABAPENTIN 100 MG: 100 CAPSULE ORAL at 08:37

## 2019-11-04 RX ADMIN — GABAPENTIN 100 MG: 100 CAPSULE ORAL at 17:32

## 2019-11-04 NOTE — SOCIAL WORK
Worker spoke with patient's sister [address] [employer] regarding patient  She confirmed that patient was stressed out about money prior to admission  She confirmed that patient is scheduled to start at [facility] on 11/7  Worker informed her that patient will be discharged sometime this week but specific date is uncertain at this time  Worker informed her that patient will be setup with an outpatient provider and a CPS referral will be made as well  Patient's sister requested patient not be referred to [facility] or [facility] for outpatient  Worker informed her that a referral will be made to Preventive Measures, patient's sister is in agreement  Worker will follow-up

## 2019-11-04 NOTE — PROGRESS NOTES
Frannie Ralph  was seen for continuing care and reviewed with staff  Progress Note - [address] Val[address]a 30 y o  female MRN: @MRN   Unit/Bed#: [facility] 693-44 Encounter: [id number]       Report from staff regarding this patient received and discussed, and records reviewed prior to seeing this patient   The patient's condition is improving  Not acutely suicidal but still depressed  Concerns for his safety remain  Tolerated   His medication well  Visible in the unit, participated in some milieu activity  Sleep: improved  Appetite: normal    Medication side effects:no complaints    Mental Status Evaluation:   Dressed in hospital attire, not in acute distress  Depressed, anxious, restricted affect, alert oriented x3  Jackson thoughts  Associations are concrete and language is appropriate  Recent remote memory I intact  Insight and judgment started to improve  Laboratory results:  I have personally reviewed all pertinent laboratory results  BMP: No results for input(s): NA, K, CL, CO2, BUN in the last 72 hours  Invalid input(s): CREA, GLU  Vitals:    11/03/19 1514   BP: 124/69   Pulse: 89   Resp: 16   Temp: (!) 97 2 °F (36 2 °C)   SpO2:         Assessment/Plan   Principal Problem:    Bipolar depression (HCC)  Active Problems:    Mild intermittent asthma without complication    Chronic midline low back pain without sciatica    Prediabetes      Recommended Treatment:   Continue medication management and supportive therapy  The patient condition started to improve  Planned medication and treatment changes: All current active medications have been reviewed  Continue treatment with group therapy, milieu therapy, occupational therapy and medication management  Risks / Benefits of Treatment:    Risks, benefits, and possible side effects of medications explained to patient and patient verbalizes understanding and agreement for treatment      Planned medication and treatment changes: All current active medications have been reviewed  Continue treatment with group therapy, milieu therapy, occupational therapy and medication management  Counseling / Coordination of Care:    Patient's progress reviewed with nursing staff  ** Please Note: This note has been constructed using a voice recognition system   **      ----------------------------------------------------------------------------------------------------------------

## 2019-11-04 NOTE — PROGRESS NOTES
11/04/19 1000   Activity/Group Checklist   Group   (recovery group)   Attendance Attended   Attendance Duration (min) 46-60   Interactions Interacted appropriately   Affect/Mood Appropriate   Goals Achieved Discussed self-esteem issues; Able to listen to others; Able to engage in interactions; Able to reflect/comment on own behavior;Able to manage/cope with feelings; Able to self-disclose; Able to recieve feedback   Power wheel- able to identify strength and weakness in life domains

## 2019-11-04 NOTE — PLAN OF CARE
Problem: Depression  Goal: Treatment Goal: Demonstrate behavioral control of depressive symptoms, verbalize feelings of improved mood/affect, and adopt new coping skills prior to discharge  11/4/2019 1149 by Tushar Tobias RN  Outcome: Adequate for Discharge  11/4/2019 1146 by Tushar Tobias RN  Outcome: Progressing  Goal: Verbalize thoughts and feelings  Description  Interventions:  - Assess and re-assess patient's level of risk   - Engage patient in 1:1 interactions, daily, for a minimum of 15 minutes   - Encourage patient to express feelings, fears, frustrations, hopes   11/4/2019 1149 by Tushar Tobias RN  Outcome: Adequate for Discharge  11/4/2019 1146 by Tushar Tobias RN  Outcome: Progressing  Goal: Refrain from harming self  Description  Interventions:  - Monitor patient closely, per order   - Supervise medication ingestion, monitor effects and side effects   11/4/2019 1149 by Tushar Tobias RN  Outcome: Adequate for Discharge  11/4/2019 1146 by Tushar Tobias RN  Outcome: Progressing  Goal: Refrain from isolation  Description  Interventions:  - Develop a trusting relationship   - Encourage socialization   11/4/2019 1149 by Tushar Tobias RN  Outcome: Adequate for Discharge  11/4/2019 1146 by Tushar Tobias RN  Outcome: Progressing  Goal: Refrain from self-neglect  11/4/2019 1149 by Tushar Tobias RN  Outcome: Adequate for Discharge  11/4/2019 1146 by Tushar Tobias RN  Outcome: Progressing  Goal: Attend and participate in unit activities, including therapeutic, recreational, and educational groups  Description  Interventions:  - Provide therapeutic and educational activities daily, encourage attendance and participation, and document same in the medical record   11/4/2019 1149 by Tushar Tobias RN  Outcome: Adequate for Discharge  11/4/2019 1146 by Tushar Tobias RN  Outcome: Progressing  Goal: Complete daily ADLs, including personal hygiene independently, as able  Description  Interventions:  - Observe, teach, and assist patient with ADLS  -  Monitor and promote a balance of rest/activity, with adequate nutrition and elimination   11/4/2019 1149 by Layla Garay RN  Outcome: Adequate for Discharge  11/4/2019 1146 by Layla Garay RN  Outcome: Progressing     Problem: DISCHARGE PLANNING  Goal: Discharge to home or other facility with appropriate resources  Description  INTERVENTIONS:  - Identify barriers to discharge w/patient and caregiver  - Arrange for needed discharge resources and transportation as appropriate  - Identify discharge learning needs (meds, wound care, etc )  - Arrange for interpretive services to assist at discharge as needed  - Refer to Case Management Department for coordinating discharge planning if the patient needs post-hospital services based on physician/advanced practitioner order or complex needs related to functional status, cognitive ability, or social support system  Outcome: Adequate for Discharge     Problem: Ineffective Coping  Goal: Participates in unit activities  Description  Interventions:  - Provide therapeutic environment   - Provide required programming   - Redirect inappropriate behaviors   Outcome: Adequate for Discharge

## 2019-11-04 NOTE — PROGRESS NOTES
11/04/19 0845   Team Meeting   Meeting Type Daily Rounds   Initial Conference Date 11/04/19   Team Members Present   Team Members Present Physician;Nurse;   Physician Team Member Dr Rakesh Rodriguez Team Member [facility] Team Member Gilda Alvarez Coma   Patient/Family Present   Patient Present No   Patient's Family Present No   Chart and labs were reviewed  Medications to be monitored  Patient visible and social   Discharge date to be determined

## 2019-11-04 NOTE — PROGRESS NOTES
11/04/19 0902   Team Meeting   Meeting Type Tx Team Meeting   Initial Conference Date 11/04/19   Team Members Present   Team Members Present Physician;Nurse;   Physician Team Member Dr Kimberli Gupta Team Member [facility] Team Member Wilbur Pozo   Patient/Family Present   Patient Present Yes   Patient's Family Present No   Psychiatrist discussed treatment plan and goals  Medication changes were discussed  Patient was encouraged to attend group therapy  Patient in agreement with plan and signed it       Strengths: health insurance, supportive family, stable housing  Limitations: financial instability, noncompliance, chronic mh

## 2019-11-04 NOTE — NURSING NOTE
Patient visible on unit at start of shift  Pleasant and cooperative with unit routine  Upon approach, patient made good eye contact, thinking clear and organized  Denied any unmet needs  HS medication compliant  Retreated to her room for sleep without issue  Will monitor
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
## 2019-11-04 NOTE — DISCHARGE INSTR - OTHER ORDERS
Crisis Information  If you are experiencing a mental health emergency, you may call the [address] 24 hours a day, 7 days per week at [phone]  In [facility], call [phone]  When you need someone to listen, the Deborah Bernheim is available for 16 hours a day, 7 days a week, from the time of 7-10am and 2pm-2am   It is not available from the hours of 2am-6am and 10am-2pm  A representative can be reached at [phone]  The [facility] Family-to-Family Education Program is a free 12-week (2 1/2 hours/week) course for families of individuals with severe brain disorders (mental illnesses)  The classes are taught by trained family members  All course materials are furnished at no cost to you  Below are some details  To register, e-mail [email or web address] or call [phone]  The curriculum focuses on schizophrenia, bipolar disorder (manic depression), clinical depression, panic disorders and obsessive-compulsive disorder (OCD)  The course discusses the clinical treatment of these illnesses and teaches the knowledge and skills that family members need to cope more effectively    Topics Include:  Learning about feelings, learning about facts   Schizophrenia, major depression and ting: diagnosis and dealing with critical periods   Subtypes of depression and bipolar disorder, panic disorder and OCD; diagnosis and causes; sharing our stories   The biology of the brain/new research   Problem solving workshops   Medication review   Empathy workshop  what its like to have a brain disorder   Communication skills workshop   Self-care and relative groups   Rehabilitation, services available   Advocacy: fighting stigma   Review and certification ceremony    Weok-sx-Xtse Education Course  The [employer] Education class is a ten week  two hours per week  experiential education course on the topic of recovery for any person with serious mental illness who is interested in establishing and maintaining wellness  The course uses a combination of lecture, interactive exercises, and structural group processes  The diversity of experience among participants affords for a lively dynamic that moves the course along  [employer]s Xbvq-ys-Trrv Education class is offered free of charge to people who experience mental illness  You do not need to be a member of [employer] to take the course  Courses are taught by teams of trained mentors/peer-teachers who are themselves experienced at living well with mental illness  Below are some details  To register, call [phone] or e-mail [email or web address]  Sign up today! [address] [facility] group is for family members, caregivers, and loved ones of individuals living with the everyday challenges of mental illness  The leaders are family members in the same situation  Sessions take place in an intimate, confidential setting to allow families to share openly with each other  These support groups allow participants to learn from the experiences of other group members, share coping strategies, and offer each other encouragement and understanding  Nicola Sepulveda know that you are not alone  Drop inno registration is necessary  Here are the times and locations  [place]  Monthly: 3rd Monday, 7:00-8:30 pm  [facility]  [address]  Monthly: 4th Tuesday, 7:00-8:30 pm  [address] [facility]  Monthly: 1st Monday, 7:00-8:30 pm  [address]         Monthly Support for Persons with Mental Illness  The Peer Support Group is a monthly meeting for individuals facing the challenges of recovering from severe and persistent mental illness  Depression, manic depression, schizophrenia, and general anxiety disorder are only a few of the diagnoses of individuals who have found a supportive place at our meetings    Our [place]  We are a fellowship of individuals who share a common goal of recovery and the ability to maintain mental and emotional stability  We help others and ourselves through sharing our experiences, strength and hope with each other  No matter how traumatic our past or how despairing our present may be, there is hope for a new day  Sessions take place in an intimate, confidential setting to allow individuals to share openly with each other  Corrinne Heckler know that you are not alone  Drop inno registration is necessary  Here are the times and locations    [place]  Monthly: 1st Monday, 7:00-8:30 pm  [facility]  [address] [facility]  Monthly: 3rd Monday, 7:00-8:30 pm  [address]  [address] [facility]

## 2019-11-04 NOTE — PROGRESS NOTES
[facility] GROUP NOTE     11/04/19 5865   Activity/Group Checklist   Group Life Skills  (Parts of A House Recovery Activity)   Attendance Attended   Attendance Duration (min) 46-60   Interactions Interacted appropriately   Affect/Mood Appropriate   Goals Achieved Able to listen to others; Able to engage in interactions; Identified feelings; Able to reflect/comment on own behavior;Able to manage/cope with feelings

## 2019-11-04 NOTE — PROGRESS NOTES
Progress Note - [address] Val[address] 30 y o  female MRN: [id number]  Unit/Bed#: [facility] 683-08 Encounter: [id number]    The patient was seen for continuing care and reviewed with treatment team  Pt states her mood has stabilized as "even" since arrival on unit and initiation of cariprazine 1 5mg  Verbalizes concern about obtaining work, family dynamics and untreated PCOS  Pt has gyn outpt appointment on the 13th to possibly address PCOS  Pt emphasizes she is female and hair growth and other body changes are result of imbalanced endrocrine system  Indicates she is able to self manage ADHD without medication  Main concern is current 6 month period of severly depressed mood which culminated in plan to jump from bridge, intermittent mod-severe irritability and general lack of family support  Overall pt reports mood is improving, denies SI/HI and denies medication side effects  Will continue to monitor, consider medication increase to 3mg daily if deterioration or no further improvement over next several days  Mental Status Evaluation:  Appearance:  Adequate hygiene and grooming   Behavior:  calm, cooperative and friendly   Mood:  anxious and depressed   Affect: appropriate   Speech: Normal rate and Normal volume   Thought Process:  Goal directed and coherent   Thought Content:  Does not verbalize delusional material   Perceptual Disturbances: Denies hallucinations and does not appear to be responding to internal stimuli   Risk Potential: No suicidal or homicidal ideation   Orientation:   AOx4     Progress Toward Goals: Pt no longer acutely suicidal, mood improving but multiple unadressed stressors and some mood instability still remain  Recommended Treatment: Continue with pharmacotherapy, group therapy, milieu therapy and occupational therapy    The patient will be maintained on the following medications:    Current Facility-Administered Medications:  acetaminophen 325 mg Oral Q6H PRN Michelle Chamorro Christian Lebron MD   acetaminophen 650 mg Oral Q6H PRN Ofelia Andrade MD   albuterol 2 puff Inhalation Q4H PRN Neha Alvarado MD   aluminum-magnesium hydroxide-simethicone 15 mL Oral Q4H PRN Bruna Jean MD   benzonatate 100 mg Oral TID PRN Neha Alvarado MD   benztropine 2 mg Intramuscular Q6H PRN Bruna Jean MD   benztropine 2 mg Oral Q6H PRN Bruna Jean MD   cariprazine 1 5 mg Oral Daily Neha Alvarado MD   gabapentin 100 mg Oral TID Neha Alvarado MD   haloperidol 5 mg Oral Q8H PRN Neha Alvarado MD   haloperidol lactate 5 mg Intramuscular Q8H PRN Neha Alvarado MD   hydrOXYzine HCL 25 mg Oral Q6H PRN Bruna Jean MD   ibuprofen 600 mg Oral Q8H PRN Neha Alvarado MD   influenza vaccine 0 5 mL Intramuscular Prior to discharge Neha Alvarado MD   magnesium hydroxide 20 mL Oral Daily PRN Bruna Jean MD   risperiDONE 1 mg Oral Q12H PRN Bruna Jean MD   traZODone 50 mg Oral HS Neha Alvarado MD       Bipolar depression [facility])

## 2019-11-04 NOTE — PROGRESS NOTES
[facility] NOTE     11/04/19 1400   Activity/Group Checklist   Group Admission/Discharge   Attendance Attended   Attendance Duration (min) 16-30   Interactions Interacted appropriately   Affect/Mood Appropriate   Goals Achieved Able to self-disclose; Able to listen to others   completed Self Assessment/DERs

## 2019-11-05 RX ORDER — NAPROXEN 500 MG/1
250 TABLET ORAL EVERY 8 HOURS PRN
Status: DISCONTINUED | OUTPATIENT
Start: 2019-11-05 | End: 2019-11-05

## 2019-11-05 RX ADMIN — GABAPENTIN 100 MG: 100 CAPSULE ORAL at 08:24

## 2019-11-05 RX ADMIN — CARIPRAZINE 1.5 MG: 1.5 CAPSULE, GELATIN COATED ORAL at 08:24

## 2019-11-05 RX ADMIN — GABAPENTIN 100 MG: 100 CAPSULE ORAL at 21:18

## 2019-11-05 RX ADMIN — IBUPROFEN 600 MG: 600 TABLET ORAL at 21:18

## 2019-11-05 RX ADMIN — TRAZODONE HYDROCHLORIDE 50 MG: 50 TABLET ORAL at 21:18

## 2019-11-05 RX ADMIN — GABAPENTIN 100 MG: 100 CAPSULE ORAL at 16:03

## 2019-11-05 NOTE — SOCIAL WORK
Worker left message for patient's sister Jarad Cesar at [phone] to coordinate discharge on 11/6

## 2019-11-05 NOTE — PROGRESS NOTES
Patient received at 1900, and was very pleasant and personable upon approach  Patient was visible and social in milieu  Patient denies SI, HI, A/V hallucinations, pain, and depression  Patient reported 3/4 anxiety  Patient was cooperative with assessment and medication administration, and continued on q7 minute checks for patient safety  Will continue to monitor and offer therapeutic support

## 2019-11-05 NOTE — PROGRESS NOTES
Progress Note - [address] Val[address] 30 y o  female MRN: [id number]  Unit/Bed#: Kristine Julio 563-04 Encounter: [id number]    The patient was seen for continuing care and reviewed with treatment team  Pt visible on unit, pleasant and calm  Requests discharge tomorrow for employment start date  Denies all psychiatric symptoms and indicates mood has stabilized since arrival  No problems reported by staff  No further medication adjustments needed at this time  Denies medication side effects  Mental Status Evaluation:  Appearance:  Adequate hygiene and grooming   Behavior:  calm, cooperative and friendly   Mood:  euthymic   Affect: appropriate   Speech: Normal rate and Normal volume   Thought Process:  Goal directed and coherent   Thought Content:  Does not verbalize delusional material   Perceptual Disturbances: Denies hallucinations and does not appear to be responding to internal stimuli   Risk Potential: No suicidal or homicidal ideation   Orientation:   AOx4     Progress Toward Goals: Pt has made substantial progress towards goals  Increased mood stability and euthymia  Plan is for discharge tomorrow  Recommended Treatment: Continue with pharmacotherapy, group therapy, milieu therapy and occupational therapy    The patient will be maintained on the following medications:    Current Facility-Administered Medications:  acetaminophen 325 mg Oral Q6H PRN Malick Novak MD   acetaminophen 650 mg Oral Q6H PRN Darci Castillo MD   albuterol 2 puff Inhalation Q4H PRN Rogelio Richardson MD   aluminum-magnesium hydroxide-simethicone 15 mL Oral Q4H PRN Malick Novak MD   benzonatate 100 mg Oral TID PRN Rogelio Richardson MD   benztropine 2 mg Intramuscular Q6H PRN Malick Novak MD   benztropine 2 mg Oral Q6H PRN Malick Novak MD   cariprazine 1 5 mg Oral Daily Rogelio Richardson MD   gabapentin 100 mg Oral TID Rogelio Richardson MD   haloperidol 5 mg Oral Q8H PRN Rogelio Richardson MD   haloperidol lactate 5 mg Intramuscular Q8H PRN Maria G Deleon MD   hydrOXYzine HCL 25 mg Oral Q6H PRN Bhavesh Fisher MD   ibuprofen 600 mg Oral Q8H PRN Maria G Deleon MD   influenza vaccine 0 5 mL Intramuscular Prior to discharge Maria G Deleon MD   magnesium hydroxide 20 mL Oral Daily PRN Bhavesh Fisher MD   risperiDONE 1 mg Oral Q12H PRN Bhavesh Fisher MD   traZODone 50 mg Oral HS Maria G Deleon MD       Bipolar depression [facility])

## 2019-11-05 NOTE — PROGRESS NOTES
11/05/19 1300   Activity/Group Checklist   Group   (recovery grp)   Attendance Attended   Attendance Duration (min) 31-45   Interactions Interacted appropriately   Affect/Mood Appropriate   Goals Achieved Able to listen to others; Able to engage in interactions; Able to self-disclose   Self sabotage and irrational beliefs

## 2019-11-05 NOTE — PROGRESS NOTES
11/05/19 1311   Team Meeting   Meeting Type Daily Rounds   Initial Conference Date 11/05/19   Team Members Present   Team Members Present Physician;Nurse;   Physician Team Member Dr Rakesh Rodriguez Team Member [id number] [facility] Management Team Member Alden Romeo   Patient/Family Present   Patient Present No   Patient's Family Present No   Chart and labs were reviewed  Medications to be monitored  Patient visible and social   Discharge pending for 11/6

## 2019-11-05 NOTE — PROGRESS NOTES
Greggtient is compliant with medication  And wilson routine She is pleasant and social with many of the peers Does attend groups once in a while Patient usually eats 100% of her mals Will continue to encourage groups and to keep a positive attitude about the future

## 2019-11-05 NOTE — PROGRESS NOTES
[facility] GROUP NOTE  Full, Active participation  Bright affect  Self disclosed appropriately in group discussion  Able to retrieve and reflect on positive past memory  11/05/19 1030   Activity/Group Checklist   Group Personal control   Attendance Attended   Attendance Duration (min) 46-60   Interactions Interacted appropriately   Affect/Mood Appropriate   Goals Achieved Able to listen to others; Able to engage in interactions   Sensory awareness, Creative Flow, Task Completion

## 2019-11-06 VITALS
SYSTOLIC BLOOD PRESSURE: 123 MMHG | TEMPERATURE: 97 F | RESPIRATION RATE: 16 BRPM | WEIGHT: 254 LBS | OXYGEN SATURATION: 100 % | HEIGHT: 65 IN | HEART RATE: 87 BPM | DIASTOLIC BLOOD PRESSURE: 89 MMHG | BODY MASS INDEX: 42.32 KG/M2

## 2019-11-06 PROCEDURE — 90686 IIV4 VACC NO PRSV 0.5 ML IM: CPT | Performed by: PSYCHIATRY & NEUROLOGY

## 2019-11-06 PROCEDURE — 99238 HOSP IP/OBS DSCHRG MGMT 30/<: CPT | Performed by: NURSE PRACTITIONER

## 2019-11-06 PROCEDURE — 90471 IMMUNIZATION ADMIN: CPT | Performed by: PSYCHIATRY & NEUROLOGY

## 2019-11-06 RX ORDER — GABAPENTIN 100 MG/1
100 CAPSULE ORAL 3 TIMES DAILY
Qty: 90 CAPSULE | Refills: 0 | Status: SHIPPED | OUTPATIENT
Start: 2019-11-06 | End: 2019-12-19

## 2019-11-06 RX ADMIN — GABAPENTIN 100 MG: 100 CAPSULE ORAL at 08:33

## 2019-11-06 RX ADMIN — CARIPRAZINE 1.5 MG: 1.5 CAPSULE, GELATIN COATED ORAL at 08:33

## 2019-11-06 RX ADMIN — INFLUENZA VIRUS VACCINE 0.5 ML: 15; 15; 15; 15 SUSPENSION INTRAMUSCULAR at 11:41

## 2019-11-06 NOTE — PROGRESS NOTES
Patient is doing  Well she has been compliant with medication attended groups and is social with peers   Patient is going to be discharged today

## 2019-11-06 NOTE — DISCHARGE SUMMARY
Discharge Summary - [address] [facility] Val[facility]uela 30 y o  female MRN: [id number]  Unit/Bed#: Kristine Julio 236-08 Encounter: [id number]     Admission Date: 11/1/2019         Discharge Date: No discharge date for patient encounter  Attending Psychiatrist: Rogelio Richardson MD    Reason for Admission/HPI:       Meds/Allergies     all current active meds have been reviewed    No Known Allergies    Objective     Vital signs in last 24 hours:  Temp:  [97 °F (36 1 °C)-97 9 °F (36 6 °C)] 97 °F (36 1 °C)  HR:  [87-90] 87  Resp:  [16] 16  BP: (114-123)/(81-89) 123/89    No intake or output data in the 24 hours ending 11/06/19 [address] [place] [address] Course: The patient was admitted to the inpatient psychiatric unit and started on every 15 minutes precautions  During the hospitalization the patient was attending individual therapy, group therapy, milieu therapy and occupational therapy  Psychiatric medications were titrated over the hospital stay  To address depressive symptoms, mood instability and anxiety symptoms the patient was started on mood stabilizer Neurontin and antipsychotic medication Latuda  Medication doses were titrated during the hospital course  Prior to beginning of treatment medications risks and benefits and possible side effects including metabolic syndrome/EPS were reviewed with the patient  The patient verbalized understanding and agreement for treatment  Patient's symptoms improved gradually over the hospital course  At the end of treatment the patient was doing well  Mood was stable at the time of discharge  The patient denied suicidal ideation, intent or plan at the time of discharge and denied homicidal ideation, intent or plan at the time of discharge  There was no overt psychosis at the time of discharge  Sleep and appetite were improved  The patient was tolerating medications and was not reporting any significant side effects at the time of discharge      Since the patient was doing well at the end of the hospitalization, treatment team felt that the patient could be safely discharged to outpatient care  We felt that Kelton Verdin achieved the maximum benefit of inpatient stay at that point and could now follow up with outpatient treatment  The outpatient follow up was arranged by the unit  upon discharge  Patient has an intake at [facility] Monday 11/11 at 1:00pm    Mental Status at Time of Discharge:   Appearance:  age appropriate and Masculine features/facial hair   Behavior:  normal   Speech:  normal volume   Mood:  euthymic   Affect:  mood-congruent   Thought Process:  normal   Thought Content:  normal   Perceptual Disturbances: None   Risk Potential: None    Sensorium:  person, place, time/date and situation   Cognition:  grossly intact   Consciousness:  alert and awake    Attention: attention span and concentration were age appropriate   Insight:  fair   Judgment: fair   Gait/Station: normal gait/station   Motor Activity: no abnormal movements       Admission Diagnosis:  Principal Problem:    Bipolar depression ([facility] )  Active Problems:    Mild intermittent asthma without complication    Chronic midline low back pain without sciatica    Prediabetes      Discharge Diagnosis:     Principal Problem:    Bipolar depression ([facility] )  Active Problems:    Mild intermittent asthma without complication    Chronic midline low back pain without sciatica    Prediabetes  Resolved Problems:    * No resolved hospital problems   *      Lab results:    Admission on 11/01/2019   Component Date Value    EXT PREG TEST UR (Ref: N* 11/01/2019 negative     Control 11/01/2019 valid     Color, UA 11/01/2019 Yellow     Clarity, UA 11/01/2019 Clear     Specific Gravity, UA 11/01/2019 1 025     pH, UA 11/01/2019 6 0     Leukocytes, UA 11/01/2019 500 0*    Nitrite, UA 11/01/2019 Negative     Protein, UA 11/01/2019 Negative     Glucose, UA 11/01/2019 Negative     Ketones, UA 11/01/2019 Negative     Bilirubin, UA 11/01/2019 Negative     Blood, UA 11/01/2019 Negative     UROBILINOGEN UA 11/01/2019 Negative     Amph/Meth UR 11/01/2019 Negative     Barbiturate Ur 11/01/2019 Negative     Benzodiazepine Urine 11/01/2019 Negative     Cocaine Urine 11/01/2019 Negative     Methadone Urine 11/01/2019 Negative     Opiate Urine 11/01/2019 Negative     PCP Ur 11/01/2019 Negative     THC Urine 11/01/2019 Negative     EXTBreath Alcohol 11/01/2019 0 000     RBC, UA 11/01/2019 None Seen     WBC, UA 11/01/2019 4-10*    Epithelial Cells 11/01/2019 Moderate*    Bacteria, UA 11/01/2019 Moderate*       Discharge Medications:    See after visit summary for reconciled discharge medications provided to patient and family  Discharge instructions/Information to patient and family:     See after visit summary for information provided to patient and family  Provisions for Follow-Up Care:    See after visit summary for information related to follow-up care and any pertinent home health orders  Discharge Statement     I spent 30 minutes discharging the patient  This time was spent on the day of discharge  I had direct contact with the patient on the day of discharge  Additional documentation is required if more than 30 minutes were spent on discharge:    I reviewed with Catracho Bhat importance of compliance with medications and outpatient treatment after discharge

## 2019-11-06 NOTE — PROGRESS NOTES
11/06/19 1000   Activity/Group Checklist   Group Other (Comment)  (Art Therapy Process Group/Visual Introduction, Discussion)   Attendance Attended   Attendance Duration (min) Greater than 60   Affect/Mood Appropriate;Normal range   Goals Achieved Identified feelings; Discussed coping strategies; Able to listen to others; Able to engage in interactions; Able to reflect/comment on own behavior;Able to manage/cope with feelings; Able to recieve feedback; Able to give feedback to another  (Able to engage materials; full participation)

## 2019-11-06 NOTE — PROGRESS NOTES
Pt pleasant and bright  upon approach  Patient visible and social  with pperrs   Patient denies SI, HI, A/V hallucinations, and depression  Patient reported 3/4 anxiety, because she is leaving  Tomorrow, but refused any medication for anxiety   Pt reported 8/10 Pain in her R knee ibuprofen given for same reason   Will continue to monitor

## 2019-11-06 NOTE — PROGRESS NOTES
11/06/19 0831   Team Meeting   Meeting Type Daily Rounds   Initial Conference Date 11/06/19   Team Members Present   Team Members Present Physician;Nurse;   Physician Team Member Dr Arie Posadas Team Member [facility] Management Team Member Susan Malloy   Patient/Family Present   Patient Present No   Patient's Family Present No   Chart and labs were reviewed  Kayleen Old was not approved by insurance  Patient pending discharge for today

## 2019-11-06 NOTE — NURSING NOTE
Discharge note  Patient was discharged at 1300 with all necessary medication and her paper work She was smiling and happy to leave

## 2019-11-06 NOTE — SOCIAL WORK
Patient is being discharged today, escorted home by her mom  Discharge address [place] [address] [place] [address]  Patient has an intake at [facility] on 11/11 at 1:00pm   Patient to follow-up with [place] regarding CPS referral   Patient denies all symptoms and is in agreement with discharge  Patient left with her medications

## 2019-11-14 ENCOUNTER — HOSPITAL ENCOUNTER (EMERGENCY)
Facility: HOSPITAL | Age: 24
Discharge: HOME | End: 2019-11-15
Attending: EMERGENCY MEDICINE
Payer: COMMERCIAL

## 2019-11-14 VITALS
RESPIRATION RATE: 18 BRPM | OXYGEN SATURATION: 98 % | DIASTOLIC BLOOD PRESSURE: 73 MMHG | TEMPERATURE: 97.7 F | SYSTOLIC BLOOD PRESSURE: 112 MMHG | HEART RATE: 89 BPM
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
## 2019-11-14 DIAGNOSIS — M25.551 HIP PAIN, ACUTE, RIGHT: ICD-10-CM

## 2019-11-14 DIAGNOSIS — S40.011A CONTUSION OF RIGHT SHOULDER, INITIAL ENCOUNTER: ICD-10-CM

## 2019-11-14 PROCEDURE — 99284 EMERGENCY DEPT VISIT MOD MDM: CPT | Performed by: EMERGENCY MEDICINE

## 2019-11-14 PROCEDURE — 99284 EMERGENCY DEPT VISIT MOD MDM: CPT

## 2019-11-14 RX ORDER — NAPROXEN 500 MG/1
500 TABLET ORAL ONCE
Status: COMPLETED | OUTPATIENT
Start: 2019-11-15 | End: 2019-11-15

## 2019-11-15 ENCOUNTER — APPOINTMENT (EMERGENCY)
Dept: RADIOLOGY | Facility: HOSPITAL | Age: 24
End: 2019-11-15
Payer: COMMERCIAL

## 2019-11-15 LAB
BACTERIA UR QL AUTO: ABNORMAL /HPF
BILIRUB UR QL STRIP: NEGATIVE
CLARITY UR: CLEAR
COLOR UR: YELLOW
GLUCOSE UR STRIP-MCNC: ABNORMAL MG/DL
HGB UR QL STRIP.AUTO: 25
KETONES UR STRIP-MCNC: ABNORMAL MG/DL
LEUKOCYTE ESTERASE UR QL STRIP: 500
MUCOUS THREADS UR QL AUTO: ABNORMAL
NITRITE UR QL STRIP: NEGATIVE
NON-SQ EPI CELLS URNS QL MICRO: ABNORMAL /HPF
OTHER STN SPEC: ABNORMAL
PH UR STRIP.AUTO: 5 [PH]
PROT UR STRIP-MCNC: ABNORMAL MG/DL
RBC #/AREA URNS AUTO: ABNORMAL /HPF
SP GR UR STRIP.AUTO: 1.02 (ref 1–1.04)
UROBILINOGEN UA: 1 MG/DL
WBC #/AREA URNS AUTO: ABNORMAL /HPF

## 2019-11-15 PROCEDURE — 73502 X-RAY EXAM HIP UNI 2-3 VIEWS: CPT

## 2019-11-15 PROCEDURE — 73030 X-RAY EXAM OF SHOULDER: CPT

## 2019-11-15 PROCEDURE — 81001 URINALYSIS AUTO W/SCOPE: CPT | Performed by: EMERGENCY MEDICINE

## 2019-11-15 RX ORDER — NAPROXEN 500 MG/1
500 TABLET ORAL 2 TIMES DAILY WITH MEALS
Qty: 20 TABLET | Refills: 0 | Status: SHIPPED | OUTPATIENT
Start: 2019-11-15 | End: 2020-02-13 | Stop reason: ALTCHOICE

## 2019-11-15 RX ADMIN — NAPROXEN 500 MG: 500 TABLET ORAL at 00:03

## 2019-11-15 NOTE — ED PROVIDER NOTES
History  Chief Complaint   Patient presents with    Fall     EMS stated that pt fell down 2 steps and hit the treadmill  pt states that her right hip and shoulder are hurting  pt states that she hit her head  pt denies LOC  pt denies neck pain      31 y/o female BBA for fall down two stairs: c/o R shoulder and hip pain  Patient states that she struck R side of face but did not lose consciousness  No anticoagulation; no headache or focal neuro symptoms/deficits  Patient normally ambulatory without assistance  Denies any chest pain, dyspnea, or dizziness prior to or after incident  Prior to Admission Medications   Prescriptions Last Dose Informant Patient Reported? Taking? albuterol (PROVENTIL HFA,VENTOLIN HFA) 90 mcg/act inhaler   No No   Sig: Inhale 2 puffs every 4 (four) hours as needed for wheezing   benzonatate (TESSALON PERLES) 100 mg capsule   No No   Sig: Take 1 capsule (100 mg total) by mouth 3 (three) times a day as needed for cough   gabapentin (NEURONTIN) 100 mg capsule   No No   Sig: Take 1 capsule (100 mg total) by mouth 3 (three) times a day   lurasidone (LATUDA) 20 mg tablet   No No   Sig: Take 1 tablet (20 mg total) by mouth daily with breakfast      Facility-Administered Medications: None       Past Medical History:   Diagnosis Date    ADHD (attention deficit hyperactivity disorder)     Bipolar 1 disorder (HCC)     PCOS (polycystic ovarian syndrome) 2015       Past Surgical History:   Procedure Laterality Date    NO PAST SURGERIES         Family History   Problem Relation Age of Onset    Cancer Mother         cervical    Breast cancer Neg Hx      I have reviewed and agree with the history as documented      Social History     Tobacco Use    Smoking status: Light Tobacco Smoker     Types: Cigarettes    Smokeless tobacco: Never Used   Substance Use Topics    Alcohol use: Yes     Comment: social     Drug use: No        Review of Systems   Musculoskeletal: Positive for arthralgias  All other systems reviewed and are negative  Physical Exam  Physical Exam   Constitutional: She is oriented to person, place, and time  She appears well-developed and well-nourished  HENT:   Head: Normocephalic and atraumatic  Right Ear: External ear normal    Left Ear: External ear normal    Mouth/Throat: Oropharynx is clear and moist    Eyes: Pupils are equal, round, and reactive to light  Conjunctivae and EOM are normal    Neck: Normal range of motion  Neck supple  Cardiovascular: Normal rate and regular rhythm  Pulmonary/Chest: Effort normal and breath sounds normal    Abdominal: Soft  Bowel sounds are normal    Musculoskeletal: She exhibits tenderness  Right shoulder: She exhibits decreased range of motion, tenderness and pain  She exhibits no bony tenderness, no swelling, no effusion, no crepitus, no deformity, no spasm, normal pulse and normal strength  Right hip: She exhibits decreased range of motion and tenderness  Neurological: She is alert and oriented to person, place, and time  She displays normal reflexes  No cranial nerve deficit or sensory deficit  She exhibits normal muscle tone  Coordination normal    Skin: Skin is warm and dry  Capillary refill takes less than 2 seconds  Psychiatric: She has a normal mood and affect  Vitals reviewed        Vital Signs  ED Triage Vitals   Temperature Pulse Respirations Blood Pressure SpO2   11/14/19 2355 11/14/19 2355 11/14/19 2355 11/14/19 2355 11/14/19 2355   97 7 °F (36 5 °C) 89 18 112/73 98 %      Temp Source Heart Rate Source Patient Position - Orthostatic VS BP Location FiO2 (%)   11/14/19 2355 11/14/19 2355 11/14/19 2355 11/14/19 2355 --   Tympanic Monitor Lying Left arm       Pain Score       11/14/19 2349       7           Vitals:    11/14/19 2355   BP: 112/73   Pulse: 89   Patient Position - Orthostatic VS: Lying         Visual Acuity  Visual Acuity      Most Recent Value   L Pupil Size (mm)  3   R Pupil Size (mm)  3          ED Medications  Medications   naproxen (NAPROSYN) tablet 500 mg (500 mg Oral Given 11/15/19 0003)       Diagnostic Studies  Results Reviewed     Procedure Component Value Units Date/Time    Urine Microscopic [[id number]]  (Abnormal) Collected:  11/15/19 0012    Lab Status:  Final result Specimen:  Urine, Clean Catch Updated:  11/15/19 0032     RBC, UA 4-10 /hpf      WBC, UA 10-20 /hpf      Epithelial Cells Moderate /hpf      Bacteria, UA Moderate /hpf      OTHER OBSERVATIONS Trichomonas Organisms Present     MUCUS THREADS Occasional    UA (URINE) with reflex to Scope [[id number]]  (Abnormal) Collected:  11/15/19 0012    Lab Status:  Final result Specimen:  Urine, Clean Catch Updated:  11/15/19 0022     Color, UA Yellow     Clarity, UA Clear     Specific Gravity, UA 1 025     pH, UA 5 0     Leukocytes,  0     Nitrite, UA Negative     Protein, UA 15 (Trace) mg/dl      Glucose,  (1/4%) mg/dl      Ketones, UA 5 (Trace) mg/dl      Bilirubin, UA Negative     Blood, UA 25 0     UROBILINOGEN UA 1 0 mg/dL                  XR shoulder 2+ views RIGHT   ED Interpretation by Jeff Hirsch DO (11/15 0038)   3v R shoulder wnl  XR hip/pelv 2-3 vws right   ED Interpretation by Jeff Hirsch DO (11/15 0037)   AP pelvis wnl  Procedures  Procedures       ED Course                               MDM    Disposition  Final diagnoses:   Fall, initial encounter   Contusion of right shoulder, initial encounter   Hip pain, acute, right     Time reflects when diagnosis was documented in both MDM as applicable and the Disposition within this note     Time User Action Codes Description Comment    11/15/2019 12:38 AM Randolph Haydee Add Mae Simon  AFPJ] Fall, initial encounter     11/15/2019 12:39 AM Randolph Haydee Add [S40 011A] Contusion of right shoulder, initial encounter     11/15/2019 12:39 AM Randolph Haydee Add [M25 551] Hip pain, acute, right       ED Disposition     ED Disposition Condition Date/Time Comment    Discharge Stable Fri Nov 15, 2019 12:38 AM Faraz Bowles discharge to home/self care  Follow-up Information     Follow up With Specialties Details Why Contact Info    Rehab DO [facility] Schedule an appointment as soon as possible for a visit in 1 week As needed [address]  [address] [phone]            Patient's Medications   Discharge Prescriptions    NAPROXEN (NAPROSYN) 500 MG TABLET    Take 1 tablet (500 mg total) by mouth 2 (two) times a day with meals       Start Date: 11/15/2019End Date: --       Order Dose: 500 mg       Quantity: 20 tablet    Refills: 0     No discharge procedures on file      ED Provider  Electronically Signed by           Mattie Montana DO  11/15/19 0040

## 2019-12-19 ENCOUNTER — OFFICE VISIT (OUTPATIENT)
Dept: FAMILY MEDICINE CLINIC | Facility: CLINIC | Age: 24
End: 2019-12-19

## 2019-12-19 VITALS
HEART RATE: 95 BPM | SYSTOLIC BLOOD PRESSURE: 122 MMHG | WEIGHT: 265 LBS | TEMPERATURE: 97.2 F | BODY MASS INDEX: 44.1 KG/M2 | OXYGEN SATURATION: 98 % | RESPIRATION RATE: 18 BRPM | DIASTOLIC BLOOD PRESSURE: 78 MMHG

## 2019-12-19 DIAGNOSIS — Z11.4 ENCOUNTER FOR SCREENING FOR HIV: ICD-10-CM

## 2019-12-19 DIAGNOSIS — R73.03 PREDIABETES: Primary | ICD-10-CM

## 2019-12-19 LAB — SL AMB POCT HEMOGLOBIN AIC: 5.8 (ref ?–6.5)

## 2019-12-19 PROCEDURE — 83036 HEMOGLOBIN GLYCOSYLATED A1C: CPT | Performed by: FAMILY MEDICINE

## 2019-12-19 PROCEDURE — 99214 OFFICE O/P EST MOD 30 MIN: CPT | Performed by: FAMILY MEDICINE

## 2019-12-19 PROCEDURE — 3044F HG A1C LEVEL LT 7.0%: CPT | Performed by: PHYSICIAN ASSISTANT

## 2019-12-19 RX ORDER — FLUOXETINE HYDROCHLORIDE 20 MG/1
CAPSULE ORAL
COMMUNITY
Start: 2019-12-17

## 2019-12-19 RX ORDER — LAMOTRIGINE 25 MG/1
TABLET ORAL
COMMUNITY
Start: 2019-12-17

## 2019-12-19 NOTE — PROGRESS NOTES
Assessment/Plan:    Instability of both shoulder joints  Last time saw orthopedist in March 2019, if acute right shoulder pain continues - patient is to return to orthopedist  Emphasized home stretching exercises  NSAIDS as needed    Bipolar depression ([address] )  Just started new meds two days ago  Follow-up psychiatrist who wanted her to get blood work - labs ordered    Prediabetes  A1c 5 8 today  Patient does not want any medications  Continue with diet modification and exercise    PCOS (polycystic ovarian syndrome)  Stable  Follow-up GYN as needed       Diagnoses and all orders for this visit:    Prediabetes  -     POCT hemoglobin A1c  -     CBC and differential; Future  -     Comprehensive metabolic panel; Future  -     TSH, 3rd generation with Free T4 reflex; Future  -     Lipid panel; Future    Encounter for screening for HIV  -     Human Immunodeficiency Virus 1/2 Antigen / Antibody ( Fourth Generation) with Reflex Testing; Future    Other orders  -     lamoTRIgine (LaMICtal) 25 mg tablet  -     FLUoxetine (PROzac) 20 mg capsule          Subjective:      Patient ID: Kenji Gonzalez is a 30 y o  female  Was prescribed new psychiatric medications two days ago  Denies SI/HI  Psychiatrist wants her to get blood work       Right shoulder pain for the past 4 days  Described as sharp and stabbing pain especially when overextending the arm  Falling asleep on it aggravates the pain  Lifts up her Mertha Kim with left hand  No recall of trauma or injury to right arm  Dislocated right shoulder in 2012  Had seen orthopedist in March 2019  The following portions of the patient's history were reviewed and updated as appropriate: allergies, current medications, past family history, past medical history, past social history, past surgical history and problem list     Review of Systems   Constitutional: Negative for chills, fatigue, fever and unexpected weight change     HENT: Negative for congestion, ear pain, rhinorrhea and sore throat  Eyes: Negative for pain and visual disturbance  Respiratory: Negative for cough, chest tightness and shortness of breath  Cardiovascular: Negative for chest pain, palpitations and leg swelling  Gastrointestinal: Negative for abdominal pain, constipation, diarrhea, nausea and vomiting  Genitourinary: Negative for difficulty urinating, dysuria and urgency  Musculoskeletal: Negative for arthralgias and back pain  +right shoulder pain   Skin: Negative for rash  Neurological: Negative for dizziness, numbness and headaches  Psychiatric/Behavioral: Negative for behavioral problems and suicidal ideas  The patient is not nervous/anxious  Objective:      /78 (BP Location: Left arm, Patient Position: Sitting, Cuff Size: Large)   Pulse 95   Temp (!) 97 2 °F (36 2 °C)   Resp 18   Wt 120 kg (265 lb)   LMP 09/19/2019 (Within Weeks)   SpO2 98%   BMI 44 10 kg/m²          Physical Exam   Constitutional: She is oriented to person, place, and time  She appears well-developed and well-nourished  HENT:   Head: Normocephalic and atraumatic  Right Ear: External ear normal    Left Ear: External ear normal    Nose: Nose normal    Mouth/Throat: Oropharynx is clear and moist    Eyes: Pupils are equal, round, and reactive to light  Conjunctivae and EOM are normal    Neck: Normal range of motion  Neck supple  Cardiovascular: Normal rate, regular rhythm, normal heart sounds and intact distal pulses  Pulmonary/Chest: Effort normal and breath sounds normal    Abdominal: Soft  Bowel sounds are normal    Musculoskeletal:        Right shoulder: She exhibits decreased range of motion, tenderness and pain  Neurological: She is alert and oriented to person, place, and time  Skin: Skin is warm  Psychiatric: She has a normal mood and affect   Her behavior is normal

## 2019-12-23 NOTE — ASSESSMENT & PLAN NOTE
Just started new meds two days ago  Follow-up psychiatrist who wanted her to get blood work - labs ordered

## 2019-12-23 NOTE — ASSESSMENT & PLAN NOTE
Last time saw orthopedist in March 2019, if acute right shoulder pain continues - patient is to return to orthopedist  Emphasized home stretching exercises  NSAIDS as needed

## 2020-02-13 ENCOUNTER — HOSPITAL ENCOUNTER (EMERGENCY)
Facility: HOSPITAL | Age: 25
Discharge: HOME | End: 2020-02-13
Attending: EMERGENCY MEDICINE | Admitting: EMERGENCY MEDICINE
Payer: COMMERCIAL

## 2020-02-13 VITALS
HEART RATE: 90 BPM | OXYGEN SATURATION: 97 % | SYSTOLIC BLOOD PRESSURE: 129 MMHG | RESPIRATION RATE: 16 BRPM | TEMPERATURE: 97.2 F | WEIGHT: 266 LBS | BODY MASS INDEX: 44.26 KG/M2 | DIASTOLIC BLOOD PRESSURE: 85 MMHG

## 2020-02-13 DIAGNOSIS — R07.89 CHEST WALL PAIN: Primary | ICD-10-CM

## 2020-02-13 PROCEDURE — 93005 ELECTROCARDIOGRAM TRACING: CPT

## 2020-02-13 PROCEDURE — 96372 THER/PROPH/DIAG INJ SC/IM: CPT

## 2020-02-13 PROCEDURE — 99284 EMERGENCY DEPT VISIT MOD MDM: CPT | Performed by: EMERGENCY MEDICINE

## 2020-02-13 PROCEDURE — 99285 EMERGENCY DEPT VISIT HI MDM: CPT

## 2020-02-13 RX ORDER — NAPROXEN 500 MG/1
500 TABLET ORAL 2 TIMES DAILY WITH MEALS
Qty: 15 TABLET | Refills: 0 | Status: SHIPPED | OUTPATIENT
Start: 2020-02-13 | End: 2020-03-05 | Stop reason: SDUPTHER

## 2020-02-13 RX ORDER — CYCLOBENZAPRINE HCL 10 MG
10 TABLET ORAL ONCE
Status: COMPLETED | OUTPATIENT
Start: 2020-02-13 | End: 2020-02-13

## 2020-02-13 RX ORDER — KETOROLAC TROMETHAMINE 30 MG/ML
30 INJECTION, SOLUTION INTRAMUSCULAR; INTRAVENOUS ONCE
Status: COMPLETED | OUTPATIENT
Start: 2020-02-13 | End: 2020-02-13

## 2020-02-13 RX ORDER — CYCLOBENZAPRINE HCL 10 MG
10 TABLET ORAL 2 TIMES DAILY PRN
Qty: 20 TABLET | Refills: 0 | Status: SHIPPED | OUTPATIENT
Start: 2020-02-13

## 2020-02-13 RX ADMIN — CYCLOBENZAPRINE HYDROCHLORIDE 10 MG: 10 TABLET, FILM COATED ORAL at 21:59

## 2020-02-13 RX ADMIN — KETOROLAC TROMETHAMINE 30 MG: 30 INJECTION, SOLUTION INTRAMUSCULAR at 21:56

## 2020-02-14 LAB
ATRIAL RATE: 79 BPM
P AXIS: 45 DEGREES
PR INTERVAL: 126 MS
QRS AXIS: 22 DEGREES
QRSD INTERVAL: 82 MS
QT INTERVAL: 366 MS
QTC INTERVAL: 419 MS
T WAVE AXIS: 43 DEGREES
VENTRICULAR RATE: 79 BPM

## 2020-02-14 PROCEDURE — 93010 ELECTROCARDIOGRAM REPORT: CPT | Performed by: INTERNAL MEDICINE

## 2020-02-14 NOTE — ED PROVIDER NOTES
History  Chief Complaint   Patient presents with    Chest Pain     right sided chest pain x 1 week   "I called my doctor & they don't have any close appointments so I'm juana' to the ER"     47-year-old female presents with complaint of right-sided chest pain over the past week  She reports that prior to this she had had URI with coughing  Pain is worse with deep inspiration, palpation, and movement of her right arm  Chest Pain   Pain location:  L chest  Pain quality: aching and dull    Pain radiates to:  Does not radiate  Pain radiates to the back: no    Pain severity:  Mild  Onset quality:  Gradual  Timing:  Intermittent  Progression:  Waxing and waning  Context: breathing    Relieved by:  Nothing  Worsened by:  Deep breathing, coughing, movement and certain positions  Ineffective treatments:  None tried  Associated symptoms: no abdominal pain, no altered mental status, no anorexia, no anxiety, no back pain, no claudication, no cough, no diaphoresis, no dizziness, no dysphagia, no fatigue, no fever, no headache, no heartburn, no lower extremity edema, no nausea, no near-syncope, no numbness, no orthopnea, no palpitations, no PND, no shortness of breath, no syncope, not vomiting and no weakness        Prior to Admission Medications   Prescriptions Last Dose Informant Patient Reported? Taking?    FLUoxetine (PROzac) 20 mg capsule   Yes No   albuterol (PROVENTIL HFA,VENTOLIN HFA) 90 mcg/act inhaler  Self No No   Sig: Inhale 2 puffs every 4 (four) hours as needed for wheezing   lamoTRIgine (LaMICtal) 25 mg tablet   Yes No      Facility-Administered Medications: None       Past Medical History:   Diagnosis Date    ADHD (attention deficit hyperactivity disorder)     Bipolar 1 disorder (HCC)     PCOS (polycystic ovarian syndrome) 2015       Past Surgical History:   Procedure Laterality Date    NO PAST SURGERIES         Family History   Problem Relation Age of Onset    Cancer Mother         cervical    Breast cancer Neg Hx      I have reviewed and agree with the history as documented  Social History     Tobacco Use    Smoking status: Former Smoker     Types: Cigarettes     Last attempt to quit: 2019     Years since quittin 2    Smokeless tobacco: Never Used   Substance Use Topics    Alcohol use: Yes     Comment: social     Drug use: No       Review of Systems   Constitutional: Negative for appetite change, chills, diaphoresis, fatigue and fever  HENT: Negative for postnasal drip, sinus pain and trouble swallowing  Eyes: Negative for redness and itching  Respiratory: Negative for cough, chest tightness, shortness of breath and wheezing  Cardiovascular: Positive for chest pain  Negative for palpitations, orthopnea, claudication, leg swelling, syncope, PND and near-syncope  Gastrointestinal: Negative for abdominal pain, anorexia, constipation, diarrhea, heartburn, nausea and vomiting  Endocrine: Negative  Genitourinary: Negative for difficulty urinating and dysuria  Musculoskeletal: Negative for back pain and myalgias  Skin: Negative for rash  Allergic/Immunologic: Negative  Neurological: Negative for dizziness, weakness, numbness and headaches  Hematological: Negative  Psychiatric/Behavioral: Negative  Physical Exam  Physical Exam   Constitutional: She is oriented to person, place, and time  She appears well-developed and well-nourished  HENT:   Head: Normocephalic and atraumatic  Nose: Nose normal    Mouth/Throat: Oropharynx is clear and moist    Eyes: Pupils are equal, round, and reactive to light  Conjunctivae and EOM are normal    Neck: Normal range of motion  Neck supple  Cardiovascular: Normal rate, regular rhythm and normal heart sounds  Pulmonary/Chest: Effort normal and breath sounds normal  No respiratory distress  She has no wheezes  Abdominal: Soft  Bowel sounds are normal  There is no tenderness  There is no guarding     Musculoskeletal: She exhibits no edema, tenderness or deformity  Arms:  Neurological: She is alert and oriented to person, place, and time  Skin: Skin is warm and dry  Capillary refill takes less than 2 seconds  No rash noted  Psychiatric: She has a normal mood and affect  Her behavior is normal    Nursing note and vitals reviewed  Vital Signs  ED Triage Vitals [02/13/20 2120]   Temperature Pulse Respirations Blood Pressure SpO2   (!) 97 2 °F (36 2 °C) 90 16 129/85 97 %      Temp Source Heart Rate Source Patient Position - Orthostatic VS BP Location FiO2 (%)   Tympanic -- Sitting Left arm --      Pain Score       8           Vitals:    02/13/20 2120   BP: 129/85   Pulse: 90   Patient Position - Orthostatic VS: Sitting         Visual Acuity      ED Medications  Medications   ketorolac (TORADOL) injection 30 mg (30 mg Intramuscular Given 2/13/20 2156)   cyclobenzaprine (FLEXERIL) tablet 10 mg (10 mg Oral Given 2/13/20 2159)       Diagnostic Studies  Results Reviewed     None                 No orders to display              Procedures  ECG 12 Lead Documentation Only  Date/Time: 2/13/2020 9:57 PM  Performed by: Garth Jeffrey DO  Authorized by:  Garth Jeffrey DO     ECG reviewed by me, the ED Provider: yes    Patient location:  ED  Rate:     ECG rate:  79    ECG rate assessment: normal    Rhythm:     Rhythm: sinus rhythm    Ectopy:     Ectopy: none    QRS:     QRS axis:  Normal  ST segments:     ST segments:  Normal  T waves:     T waves: non-specific               ED Course                               MDM      Disposition  Final diagnoses:   Chest wall pain     Time reflects when diagnosis was documented in both MDM as applicable and the Disposition within this note     Time User Action Codes Description Comment    2/13/2020  9:48 PM Alfreda Mcardle Add [R07 89] Chest wall pain       ED Disposition     ED Disposition Condition Date/Time Comment    Discharge Stable u Feb 13, 2020  9:48 PM Ericka Jackman discharge to home/self care  Follow-up Information     Follow up With Specialties Details Why Contact Info    [address] [place] [address], [address]   [address]  [address] Emergency Department Emergency Medicine  If symptoms worsen [address] [address] [phone]          Discharge Medication List as of 2/13/2020  9:49 PM      START taking these medications    Details   cyclobenzaprine (FLEXERIL) 10 mg tablet Take 1 tablet (10 mg total) by mouth 2 (two) times a day as needed for muscle spasms, Starting u 2/13/2020, Normal      naproxen (NAPROSYN) 500 mg tablet Take 1 tablet (500 mg total) by mouth 2 (two) times a day with meals, Starting Thu 2/13/2020, Normal         CONTINUE these medications which have NOT CHANGED    Details   albuterol (PROVENTIL HFA,VENTOLIN HFA) 90 mcg/act inhaler Inhale 2 puffs every 4 (four) hours as needed for wheezing, Starting Mon 9/23/2019, Print      FLUoxetine (PROzac) 20 mg capsule Starting Tue 12/17/2019, Historical Med      lamoTRIgine (LaMICtal) 25 mg tablet Starting Tue 12/17/2019, Historical Med           No discharge procedures on file      PDMP Review     None          ED Provider  Electronically Signed by           Gabriel Brambila DO  02/13/20 2469

## 2020-02-20 ENCOUNTER — HOSPITAL ENCOUNTER (EMERGENCY)
Facility: HOSPITAL | Age: 25
Discharge: HOME | End: 2020-02-21
Attending: EMERGENCY MEDICINE | Admitting: EMERGENCY MEDICINE
Payer: COMMERCIAL

## 2020-02-20 VITALS
RESPIRATION RATE: 20 BRPM | TEMPERATURE: 100 F | SYSTOLIC BLOOD PRESSURE: 132 MMHG | OXYGEN SATURATION: 98 % | BODY MASS INDEX: 44.65 KG/M2 | WEIGHT: 268.3 LBS | HEART RATE: 115 BPM | DIASTOLIC BLOOD PRESSURE: 70 MMHG

## 2020-02-20 DIAGNOSIS — J32.9 SINUSITIS: ICD-10-CM

## 2020-02-20 DIAGNOSIS — R51.9 HEADACHE: Primary | ICD-10-CM

## 2020-02-20 PROCEDURE — 99283 EMERGENCY DEPT VISIT LOW MDM: CPT

## 2020-02-20 PROCEDURE — 99284 EMERGENCY DEPT VISIT MOD MDM: CPT | Performed by: EMERGENCY MEDICINE

## 2020-02-20 PROCEDURE — 96372 THER/PROPH/DIAG INJ SC/IM: CPT

## 2020-02-20 RX ORDER — ONDANSETRON 4 MG/1
8 TABLET, ORALLY DISINTEGRATING ORAL ONCE
Status: COMPLETED | OUTPATIENT
Start: 2020-02-20 | End: 2020-02-20

## 2020-02-20 RX ORDER — OXYCODONE HYDROCHLORIDE AND ACETAMINOPHEN 5; 325 MG/1; MG/1
1 TABLET ORAL ONCE
Status: COMPLETED | OUTPATIENT
Start: 2020-02-20 | End: 2020-02-20

## 2020-02-20 RX ORDER — KETOROLAC TROMETHAMINE 30 MG/ML
15 INJECTION, SOLUTION INTRAMUSCULAR; INTRAVENOUS ONCE
Status: COMPLETED | OUTPATIENT
Start: 2020-02-20 | End: 2020-02-20

## 2020-02-20 RX ADMIN — OXYCODONE HYDROCHLORIDE AND ACETAMINOPHEN 1 TABLET: 5; 325 TABLET ORAL at 23:50

## 2020-02-20 RX ADMIN — ONDANSETRON 8 MG: 4 TABLET, ORALLY DISINTEGRATING ORAL at 22:37

## 2020-02-20 RX ADMIN — KETOROLAC TROMETHAMINE 15 MG: 30 INJECTION, SOLUTION INTRAMUSCULAR; INTRAVENOUS at 22:42

## 2020-02-21 RX ORDER — PSEUDOEPHEDRINE HYDROCHLORIDE 30 MG/1
60 TABLET ORAL EVERY 8 HOURS PRN
Qty: 30 TABLET | Refills: 0 | Status: SHIPPED | OUTPATIENT
Start: 2020-02-21

## 2020-02-21 RX ORDER — AMOXICILLIN AND CLAVULANATE POTASSIUM 875; 125 MG/1; MG/1
1 TABLET, FILM COATED ORAL EVERY 12 HOURS
Qty: 14 TABLET | Refills: 0 | Status: SHIPPED | OUTPATIENT
Start: 2020-02-21 | End: 2020-03-02

## 2020-02-21 RX ORDER — AMOXICILLIN AND CLAVULANATE POTASSIUM 875; 125 MG/1; MG/1
1 TABLET, FILM COATED ORAL EVERY 12 HOURS
Qty: 14 TABLET | Refills: 0 | Status: SHIPPED | OUTPATIENT
Start: 2020-02-21 | End: 2020-02-21 | Stop reason: SDUPTHER

## 2020-02-21 RX ORDER — PSEUDOEPHEDRINE HYDROCHLORIDE 30 MG/1
60 TABLET ORAL EVERY 8 HOURS PRN
Qty: 30 TABLET | Refills: 0 | Status: SHIPPED | OUTPATIENT
Start: 2020-02-21 | End: 2020-02-21 | Stop reason: SDUPTHER

## 2020-02-21 NOTE — ED NOTES
Pt does report just coming off her period   Pt unable to urinate prior to medicating- provider made aware and is okay with medicating pt prior to POCT pregnancy     Edgar Feeling  02/20/20 2266

## 2020-02-21 NOTE — ED PROVIDER NOTES
History  Chief Complaint   Patient presents with    Migraine     Migraine that started yesterday at 0700  Patient took Naproxen at 2300 last night  Patient took Trazadone at 11:00 today  29 y/o female c/o migraine-type headache which began yesterday at 1900; headache is typical for patient  States has photophobia, nausea, and pain which is nonradiating, throbbing, and bilateral   She denies any vomiting, aura, or visual disturbances  Denies fever, rash, or joint pain  Took Naprosyn PTA with minimal relief  Prior to Admission Medications   Prescriptions Last Dose Informant Patient Reported? Taking? FLUoxetine (PROzac) 20 mg capsule   Yes No   albuterol (PROVENTIL HFA,VENTOLIN HFA) 90 mcg/act inhaler  Self No No   Sig: Inhale 2 puffs every 4 (four) hours as needed for wheezing   cyclobenzaprine (FLEXERIL) 10 mg tablet   No No   Sig: Take 1 tablet (10 mg total) by mouth 2 (two) times a day as needed for muscle spasms   lamoTRIgine (LaMICtal) 25 mg tablet   Yes No   naproxen (NAPROSYN) 500 mg tablet   No No   Sig: Take 1 tablet (500 mg total) by mouth 2 (two) times a day with meals      Facility-Administered Medications: None       Past Medical History:   Diagnosis Date    ADHD (attention deficit hyperactivity disorder)     Bipolar 1 disorder (HCC)     PCOS (polycystic ovarian syndrome)        Past Surgical History:   Procedure Laterality Date    NO PAST SURGERIES         Family History   Problem Relation Age of Onset    Cancer Mother         cervical    Breast cancer Neg Hx      I have reviewed and agree with the history as documented  Social History     Tobacco Use    Smoking status: Former Smoker     Types: Cigarettes     Last attempt to quit: 2019     Years since quittin 2    Smokeless tobacco: Never Used   Substance Use Topics    Alcohol use: Yes     Comment: social     Drug use: No       Review of Systems   Eyes: Positive for photophobia     Gastrointestinal: Positive for nausea  Neurological: Positive for headaches  All other systems reviewed and are negative  Physical Exam  Physical Exam   Constitutional: She is oriented to person, place, and time  She appears well-developed and well-nourished  HENT:   Head: Normocephalic and atraumatic  Right Ear: External ear normal    Left Ear: External ear normal    Nose: Nose normal    Mouth/Throat: Oropharynx is clear and moist    Eyes: Pupils are equal, round, and reactive to light  Conjunctivae and EOM are normal    Neck: Normal range of motion  Neck supple  No neck rigidity  No Brudzinski's sign and no Kernig's sign noted  Cardiovascular: Normal rate, regular rhythm and normal heart sounds  Pulmonary/Chest: Effort normal and breath sounds normal    Abdominal: Soft  Bowel sounds are normal    Musculoskeletal: Normal range of motion  Neurological: She is alert and oriented to person, place, and time  Skin: Skin is warm and dry  Psychiatric: She has a normal mood and affect  Vitals reviewed        Vital Signs  ED Triage Vitals   Temperature Pulse Respirations Blood Pressure SpO2   02/20/20 2210 02/20/20 2210 02/20/20 2210 02/20/20 2213 02/20/20 2210   100 °F (37 8 °C) (!) 115 20 132/70 98 %      Temp Source Heart Rate Source Patient Position - Orthostatic VS BP Location FiO2 (%)   02/20/20 2210 02/20/20 2210 -- -- --   Tympanic Monitor         Pain Score       02/20/20 2210       8           Vitals:    02/20/20 2210 02/20/20 2213   BP:  132/70   Pulse: (!) 115          Visual Acuity      ED Medications  Medications   ketorolac (TORADOL) injection 15 mg (15 mg Intramuscular Given 2/20/20 2242)   ondansetron (ZOFRAN-ODT) dispersible tablet 8 mg (8 mg Oral Given 2/20/20 2237)   oxyCODONE-acetaminophen (PERCOCET) 5-325 mg per tablet 1 tablet (1 tablet Oral Given 2/20/20 2350)       Diagnostic Studies  Results Reviewed     Procedure Component Value Units Date/Time    POCT pregnancy, urine [[id number]]     Lab Status:  No result                  No orders to display              Procedures  Procedures         ED Course                               MDM      Disposition  Final diagnoses:   Headache   Sinusitis     Time reflects when diagnosis was documented in both MDM as applicable and the Disposition within this note     Time User Action Codes Description Comment    2/21/2020 12:08 AM Shaggy Perry Add [R51] Headache     2/21/2020 12:09 AM Shaggy Perry Add [J32 9] Sinusitis       ED Disposition     ED Disposition Condition Date/Time Comment    Discharge Stable Fri Feb 21, 2020 12:08 AM Destiny Curry discharge to home/self care  Follow-up Information     Follow up With Specialties Details Why Contact Info    Rehab Leopoldo Pratt DO Family Medicine Schedule an appointment as soon as possible for a visit in 1 week As needed [address]  [address] [facility]  [address]  \[facility] [place] [facility]\"" [address]             Patient's Medications   Discharge Prescriptions    AMOXICILLIN-CLAVULANATE (AUGMENTIN) 875-125 MG PER TABLET    Take 1 tablet by mouth every 12 (twelve) hours for 10 days       Start Date: 2/21/2020 End Date: 3/2/2020       Order Dose: 1 tablet       Quantity: 14 tablet    Refills: 0    PSEUDOEPHEDRINE (SUDAFED) 30 MG TABLET    Take 2 tablets (60 mg total) by mouth every 8 (eight) hours as needed for congestion       Start Date: 2/21/2020 End Date: --       Order Dose: 60 mg       Quantity: 30 tablet    Refills: 0     No discharge procedures on file      PDMP Review     None          ED Provider  Electronically Signed by           Sudeep Grace DO  02/21/20 0011

## 2020-03-05 ENCOUNTER — OFFICE VISIT (OUTPATIENT)
Dept: FAMILY MEDICINE CLINIC | Facility: CLINIC | Age: 25
End: 2020-03-05

## 2020-03-05 VITALS
HEART RATE: 104 BPM | OXYGEN SATURATION: 97 % | TEMPERATURE: 96.6 F | WEIGHT: 257.1 LBS | SYSTOLIC BLOOD PRESSURE: 104 MMHG | DIASTOLIC BLOOD PRESSURE: 68 MMHG | RESPIRATION RATE: 20 BRPM | BODY MASS INDEX: 42.84 KG/M2 | HEIGHT: 65 IN

## 2020-03-05 DIAGNOSIS — G89.29 CHRONIC NONINTRACTABLE HEADACHE, UNSPECIFIED HEADACHE TYPE: Primary | ICD-10-CM

## 2020-03-05 DIAGNOSIS — R51.9 CHRONIC NONINTRACTABLE HEADACHE, UNSPECIFIED HEADACHE TYPE: Primary | ICD-10-CM

## 2020-03-05 DIAGNOSIS — J06.9 VIRAL URI WITH COUGH: ICD-10-CM

## 2020-03-05 PROCEDURE — 1036F TOBACCO NON-USER: CPT | Performed by: FAMILY MEDICINE

## 2020-03-05 PROCEDURE — 3008F BODY MASS INDEX DOCD: CPT | Performed by: FAMILY MEDICINE

## 2020-03-05 PROCEDURE — T1015 CLINIC SERVICE: HCPCS | Performed by: FAMILY MEDICINE

## 2020-03-05 PROCEDURE — 99213 OFFICE O/P EST LOW 20 MIN: CPT | Performed by: FAMILY MEDICINE

## 2020-03-05 RX ORDER — ALBUTEROL SULFATE 90 UG/1
2 AEROSOL, METERED RESPIRATORY (INHALATION) EVERY 4 HOURS PRN
Qty: 1 INHALER | Refills: 1 | Status: SHIPPED | OUTPATIENT
Start: 2020-03-05 | End: 2020-06-11 | Stop reason: SDUPTHER

## 2020-03-05 RX ORDER — NAPROXEN 500 MG/1
500 TABLET ORAL 2 TIMES DAILY WITH MEALS
Qty: 60 TABLET | Refills: 2 | Status: SHIPPED | OUTPATIENT
Start: 2020-03-05

## 2020-03-06 NOTE — PROGRESS NOTES
Assessment/Plan:     Chronic nonintractable headache    Patient has been having intermittent headaches for about 2 years, relieved with OTC Tylenol  She started to have worsening of frequency of headache for about last 2 months  She denies photophobia, nausea/vomiting, visual changes  Will prescribe Naproxen 500 mg BID  Instructed to avoid caffeine, alcohol, loud sounds, right light, get enough sleep  Will follow up in 4 weeks   Diagnoses and all orders for this visit:    Chronic nonintractable headache, unspecified headache type  -     naproxen (NAPROSYN) 500 mg tablet; Take 1 tablet (500 mg total) by mouth 2 (two) times a day with meals    Viral URI with cough  -     albuterol (PROVENTIL HFA,VENTOLIN HFA) 90 mcg/act inhaler; Inhale 2 puffs every 4 (four) hours as needed for wheezing          Subjective:     Patient ID: Lili Yu is a 30 y o  female  Patient presented due to complains of headache that has been almost daily for the last 2 months  It is mostly located in the frontal area bilaterally  Patient denies nausea/vomiting, visual changes or other associated factors  She has been experiencing intermittent headache for the last 2 years  Family history positive for grandmother having migraine  Review of Systems   Constitutional: Negative for chills, diaphoresis, fatigue and fever  Eyes: Negative for photophobia, pain and discharge  Respiratory: Negative for cough, chest tightness, shortness of breath and wheezing  Cardiovascular: Negative for chest pain, palpitations and leg swelling  Gastrointestinal: Negative for abdominal pain and nausea  Genitourinary: Negative for difficulty urinating and frequency  Musculoskeletal: Negative for joint swelling and neck stiffness  Skin: Negative for color change, pallor and rash  Neurological: Positive for headaches  Negative for dizziness, syncope and numbness           Objective:     Physical Exam   Constitutional: She is oriented to person, place, and time  She appears well-developed and well-nourished  No distress  HENT:   Head: Normocephalic and atraumatic  Eyes: Pupils are equal, round, and reactive to light  EOM are normal  No scleral icterus  Neck: Normal range of motion  Neck supple  Cardiovascular: Normal rate, regular rhythm and normal heart sounds  Exam reveals no gallop and no friction rub  No murmur heard  Pulmonary/Chest: Effort normal and breath sounds normal  No respiratory distress  She has no wheezes  She has no rales  She exhibits no tenderness  Abdominal: Soft  Bowel sounds are normal  She exhibits no distension  There is no tenderness  There is no rebound  Musculoskeletal: Normal range of motion  She exhibits no edema, tenderness or deformity  Lymphadenopathy:     She has no cervical adenopathy  Neurological: She is alert and oriented to person, place, and time  Skin: Skin is warm and dry  No rash noted  No erythema  No pallor  Psychiatric: She has a normal mood and affect

## 2020-03-06 NOTE — ASSESSMENT & PLAN NOTE
Patient has been having intermittent headaches for about 2 years, relieved with OTC Tylenol  She started to have worsening of frequency of headache for about last 2 months   She denies photophobia, nausea/vomiting, visual changes  Will prescribe Naproxen 500 mg BID  Instructed to avoid caffeine, alcohol, loud sounds, right light, get enough sleep  Will follow up in 4 weeks

## 2020-06-11 ENCOUNTER — TELEMEDICINE (OUTPATIENT)
Dept: FAMILY MEDICINE CLINIC | Facility: CLINIC | Age: 25
End: 2020-06-11

## 2020-06-11 DIAGNOSIS — F31.9 BIPOLAR DEPRESSION (HCC): Primary | ICD-10-CM

## 2020-06-11 DIAGNOSIS — J45.20 MILD INTERMITTENT ASTHMA WITHOUT COMPLICATION: ICD-10-CM

## 2020-06-11 DIAGNOSIS — G89.29 CHRONIC MIDLINE LOW BACK PAIN WITHOUT SCIATICA: ICD-10-CM

## 2020-06-11 DIAGNOSIS — R73.03 PREDIABETES: ICD-10-CM

## 2020-06-11 DIAGNOSIS — M54.50 CHRONIC MIDLINE LOW BACK PAIN WITHOUT SCIATICA: ICD-10-CM

## 2020-06-11 PROCEDURE — T1015 CLINIC SERVICE: HCPCS | Performed by: FAMILY MEDICINE

## 2020-06-11 PROCEDURE — 99213 OFFICE O/P EST LOW 20 MIN: CPT | Performed by: FAMILY MEDICINE

## 2020-06-11 RX ORDER — ALBUTEROL SULFATE 90 UG/1
2 AEROSOL, METERED RESPIRATORY (INHALATION) EVERY 4 HOURS PRN
Qty: 1 INHALER | Refills: 1 | Status: SHIPPED | OUTPATIENT
Start: 2020-06-11

## 2020-07-29 ENCOUNTER — TRANSCRIBE ORDERS (OUTPATIENT)
Dept: LAB | Facility: HOSPITAL | Age: 25
End: 2020-07-29

## 2020-07-29 ENCOUNTER — APPOINTMENT (OUTPATIENT)
Dept: LAB | Facility: HOSPITAL | Age: 25
End: 2020-07-29
Payer: COMMERCIAL

## 2020-07-29 ENCOUNTER — OFFICE VISIT (OUTPATIENT)
Dept: LAB | Facility: HOSPITAL | Age: 25
End: 2020-07-29
Payer: COMMERCIAL

## 2020-07-29 DIAGNOSIS — F31.63 SEVERE MIXED BIPOLAR I DISORDER WITHOUT PSYCHOTIC FEATURES (HCC): ICD-10-CM

## 2020-07-29 DIAGNOSIS — F43.10 POSTTRAUMATIC STRESS DISORDER: ICD-10-CM

## 2020-07-29 DIAGNOSIS — Z79.899 ENCOUNTER FOR LONG-TERM (CURRENT) USE OF OTHER MEDICATIONS: ICD-10-CM

## 2020-07-29 DIAGNOSIS — F90.0 ADHD, PREDOMINANTLY INATTENTIVE TYPE: ICD-10-CM

## 2020-07-29 DIAGNOSIS — Z79.899 ENCOUNTER FOR LONG-TERM (CURRENT) USE OF OTHER MEDICATIONS: Primary | ICD-10-CM

## 2020-07-29 LAB
ALBUMIN SERPL BCP-MCNC: 4.3 G/DL (ref 3–5.2)
ALP SERPL-CCNC: 81 U/L (ref 43–122)
ALT SERPL W P-5'-P-CCNC: 77 U/L (ref 9–52)
ANION GAP SERPL CALCULATED.3IONS-SCNC: 10 MMOL/L (ref 5–14)
AST SERPL W P-5'-P-CCNC: 80 U/L (ref 14–36)
ATRIAL RATE: 85 BPM
BASOPHILS # BLD AUTO: 0 THOUSANDS/ΜL (ref 0–0.1)
BASOPHILS NFR BLD AUTO: 1 % (ref 0–1)
BILIRUB SERPL-MCNC: 0.8 MG/DL
BUN SERPL-MCNC: 8 MG/DL (ref 5–25)
CALCIUM SERPL-MCNC: 9.3 MG/DL (ref 8.4–10.2)
CHLORIDE SERPL-SCNC: 103 MMOL/L (ref 97–108)
CHOLEST SERPL-MCNC: 142 MG/DL
CO2 SERPL-SCNC: 25 MMOL/L (ref 22–30)
CREAT SERPL-MCNC: 0.62 MG/DL (ref 0.6–1.2)
EOSINOPHIL # BLD AUTO: 0.1 THOUSAND/ΜL (ref 0–0.4)
EOSINOPHIL NFR BLD AUTO: 1 % (ref 0–6)
ERYTHROCYTE [DISTWIDTH] IN BLOOD BY AUTOMATED COUNT: 14.3 %
GFR SERPL CREATININE-BSD FRML MDRD: 125 ML/MIN/1.73SQ M
GLUCOSE P FAST SERPL-MCNC: 124 MG/DL (ref 70–99)
HCT VFR BLD AUTO: 41.4 % (ref 36–46)
HDLC SERPL-MCNC: 28 MG/DL
HGB BLD-MCNC: 14.4 G/DL (ref 12–16)
LDLC SERPL CALC-MCNC: 67 MG/DL
LYMPHOCYTES # BLD AUTO: 2.3 THOUSANDS/ΜL (ref 0.5–4)
LYMPHOCYTES NFR BLD AUTO: 28 % (ref 25–45)
MCH RBC QN AUTO: 30.7 PG (ref 26–34)
MCHC RBC AUTO-ENTMCNC: 34.9 G/DL (ref 31–36)
MCV RBC AUTO: 88 FL (ref 80–100)
MONOCYTES # BLD AUTO: 0.5 THOUSAND/ΜL (ref 0.2–0.9)
MONOCYTES NFR BLD AUTO: 6 % (ref 1–10)
NEUTROPHILS # BLD AUTO: 5.4 THOUSANDS/ΜL (ref 1.8–7.8)
NEUTS SEG NFR BLD AUTO: 65 % (ref 45–65)
NONHDLC SERPL-MCNC: 114 MG/DL
P AXIS: 6 DEGREES
PLATELET # BLD AUTO: 166 THOUSANDS/UL (ref 150–450)
PMV BLD AUTO: 9.7 FL (ref 8.9–12.7)
POTASSIUM SERPL-SCNC: 3.8 MMOL/L (ref 3.6–5)
PR INTERVAL: 122 MS
PROT SERPL-MCNC: 7.8 G/DL (ref 5.9–8.4)
QRS AXIS: 29 DEGREES
QRSD INTERVAL: 82 MS
QT INTERVAL: 374 MS
QTC INTERVAL: 445 MS
RBC # BLD AUTO: 4.71 MILLION/UL (ref 4–5.2)
SODIUM SERPL-SCNC: 138 MMOL/L (ref 137–147)
T WAVE AXIS: 35 DEGREES
TRIGL SERPL-MCNC: 235 MG/DL
TSH SERPL DL<=0.05 MIU/L-ACNC: 1.79 UIU/ML (ref 0.47–4.68)
VENTRICULAR RATE: 85 BPM
WBC # BLD AUTO: 8.3 THOUSAND/UL (ref 4.5–11)

## 2020-07-29 PROCEDURE — 93010 ELECTROCARDIOGRAM REPORT: CPT | Performed by: INTERNAL MEDICINE

## 2020-07-29 PROCEDURE — 93005 ELECTROCARDIOGRAM TRACING: CPT

## 2020-07-29 PROCEDURE — 85025 COMPLETE CBC W/AUTO DIFF WBC: CPT

## 2020-07-29 PROCEDURE — 80053 COMPREHEN METABOLIC PANEL: CPT

## 2020-07-29 PROCEDURE — 80061 LIPID PANEL: CPT

## 2020-07-29 PROCEDURE — 84443 ASSAY THYROID STIM HORMONE: CPT

## 2020-07-29 PROCEDURE — 36415 COLL VENOUS BLD VENIPUNCTURE: CPT

## 2020-07-29 PROCEDURE — 80307 DRUG TEST PRSMV CHEM ANLYZR: CPT

## 2020-07-30 LAB
AMPHETAMINES UR QL SCN: NEGATIVE NG/ML
BARBITURATES UR QL SCN: NEGATIVE NG/ML
BENZODIAZ UR QL: NEGATIVE NG/ML
BZE UR QL: NEGATIVE NG/ML
CANNABINOIDS UR QL SCN: NEGATIVE NG/ML
METHADONE UR QL SCN: NEGATIVE NG/ML
OPIATES UR QL: NEGATIVE NG/ML
PCP UR QL: NEGATIVE NG/ML
PROPOXYPH UR QL SCN: NEGATIVE NG/ML

## 2023-02-11 ENCOUNTER — VBI (OUTPATIENT)
Dept: ADMINISTRATIVE | Facility: OTHER | Age: 28
End: 2023-02-11

## 2023-09-29 ENCOUNTER — VBI (OUTPATIENT)
Dept: ADMINISTRATIVE | Facility: OTHER | Age: 28
End: 2023-09-29